# Patient Record
(demographics unavailable — no encounter records)

---

## 2024-10-09 NOTE — PHYSICAL EXAM
[None] : none [Average] : average [Cooperative] : cooperative [Euthymic] : euthymic [Full] : full [Clear] : clear [Linear/Goal Directed] : linear/goal directed [None Reported] : none reported [WNL] : within normal limits [FreeTextEntry7] : No SI/HI

## 2024-10-09 NOTE — PLAN
[Medication education provided] : Medication education provided. [Rationale for medication choices, possible risks/precautions, benefits, alternative treatment choices, and consequences of non-treatment discussed] : Rationale for medication choices, possible risks/precautions, benefits, alternative treatment choices, and consequences of non-treatment discussed with patient/family/caregiver  [FreeTextEntry5] : Psychoeducation and supportive therapy provided, and discussed rationale for recommended meds  Sleep hygiene Behavior activation Continue Latuda 40 mg/day, trazodone 150 mg HS, continue Celexa to 40 mg/day, Lamictal 200 mg BID Clonazepam 1 mg once a day as needed for panic attacks. (Patient reported today did not need to take Klonopin since last visit and does not need a prescription today) Continue individual therapy (outside)- prn Emergency procedures were discussed: pt. educated to call 911 or go to nearest ER for worsening of symptoms/suicidal/homicidal ideation.  RTC in 3 months or earlier if not tolerating reduction of trazodone Patient expressed understanding and agreement with above plan.   I stop checked today: Reference #: [id number]  Practitioner Count: 0 Pharmacy Count: 0 Current Opioid Prescriptions: 0 Current Benzodiazepine Prescriptions: 0 Current Stimulant Prescriptions: 0   Patient Demographic Information (PDI)     PDI	First Name	Last Name	Birth Date	Gender	Street Address	[facility]	Zip Code [place]	07/23/1982	Female	[address] [facility]	[id number]  Prescription Information    PDI Filter:   PDI	My Rx	Current Rx	Drug Type	Rx Written	Rx Dispensed	Drug	Quantity	Days Supply	Prescriber Name	Prescriber LAKEISHA #	Payment Method	Dispenser A	Y	N	B	02/02/2024	02/05/2024	clonazepam 1 mg tablet	30	30	Ruma Camargo	[id number]	Insurance	[facility] #[id number]

## 2024-10-09 NOTE — REASON FOR VISIT
[Patient preference] : as per patient preference [Telehealth (audio & video) - Individual/Group] : This visit was provided via telehealth using real-time 2-way audio visual technology. [Home] : The patient, [unfilled], was located at home, [unfilled], at the time of the visit. [Verbal consent obtained from patient/other participant(s)] : Verbal consent for telehealth/telephonic services obtained from patient/other participant(s) [Patient] : Patient [Other Location: e.g. Home (Enter Location, City,State)___] : The provider was located at [unfilled]. [Patient's space is appropriate for telehealth and maintains privacy/confidentiality.] : Patient's space is appropriate for telehealth and maintains privacy/confidentiality. [Participant(s) identity verified] : Participant(s) identity verified. [FreeTextEntry1] : depression, anxiety

## 2024-10-09 NOTE — HISTORY OF PRESENT ILLNESS
[FreeTextEntry1] : Patient states she has been doing well since last visit.  She denied feeling depressed and denied any anger outbursts or irritability.   Patient reported she is sleeping well and takes trazodone at bedtime. She states she has been feeling tired in the daytime for a while and recently had blood work and all the blood test results came back normal. She decided to lower trazodone to 75 mg, half tab of 150 mg, about 3 weeks ago, and she felt daytime tiredness had decreased and she is hoping to lower the dose down to 50 mg HS. She states she has not had problem falling asleep and or staying asleep with lowering trazodone dose.  Patient denied hearing voices or seeing shadows or images that other people around or not hearing or seeing.   Patient denied passive or active suicidal ideation.  Patient denied any thoughts of self-harm.   She denied excess ETOH use/abuse, denied cannabis and other substance use/abuse No new medical issues, no new medication since last visit Patient reported she is continuing to take Wegovy for weight loss,

## 2024-10-09 NOTE — DISCUSSION/SUMMARY
[FreeTextEntry1] : The patient is a 41 yo woman with a long history of recurrent depression, ADHD with onset in childhood. Caregiver stress+.  She reported mood instability and depression and Lamictal was increased on 11/28/18, with brief mood stability, on 2/15/1:presented with worsening sx of depression, lack of motivation and desire to do things, and decline in functioning and cross titration with tapering and stopping Celexa with titrating Zoloft.  4/3/19: she is so far doing well with sertraline switch, but not complete, continue cross taper and  5/7/19: patient called on 4/23/19 to report that she is not doing well and since she lowered Celexa to 20 mg and increased sertraline to 100 mg she is very irritable, and wanted to go back to lower dose of sertraline and increase Celexa. She reports she is doing worse and thinks this is because she has come down of Celexa quickly, though cross taper was over a period of few weeks and she does not appear to be having withdrawal sx, and insists she was doing the best on combination of Celexa 40 mg and sertraline 25 mg/day, and would like to resume that dose combination. She was informed that they both are SSRI and that it is not recommended patient take 2 SSRIs and if her depression worsens, we would have to consider increasing sertraline, and if so, would recommend tapering off Celexa, or stop Sertraline and go back to previous dose of Celexa, though that was no longer effective and hence the cross taper was started. She states she agrees to increase sertraline and lower Celexa and stop it if her depression were to increase, and wants to remains on reduced dose of Celexa 40 mg/day and sertraline 25 mg/day.  Meds: Celexa 40 mg/day and Sertraline 25, Continue Lamictal 200 mg BID. Continue Trazodone 100 mg HS for insomnia, Increase Vyvanse dose to 30 mg/day, DC Klonopin, switch to Ativan 1 mg BID prn for severe anxiety   5/17/19: Patient states she remains depressed as last visit and not irritable, but very "flat" and is taking low dose Celexa and sertraline.  Patient states she is "not sure anymore" and is worried about going up on Celexa and stay on sertraline, "what if that Celexa does not help".  Continue Celexa 20 mg/day and titrate sertraline up 25 mg every 3 days to 100 mg/day Continue Lamictal 200 mg BID. Continue Trazodone 100 mg HS for insomnia, Increase Vyvanse dose to 30 mg/day,  Ativan 1 mg BID prn for severe anxiety (she will call when she needs refill for ativan)  6/3/19 Patient states she is feeling better with current dose of sertraline, but she is worried about gaining weight and wants to be off it, and agrees to stop Celexa if we switched to another medication to help with depression. She also wants to go back to trial of low Dexedrine Psychoeducation and rationale and side effects, risks and benefits of med changes discussed 6/3/19: Stop Celexa  6/4/19 to 6/7/19: Sertraline 75 mg/day, 6/8 to 6/11 sertraline 50 mg/day, 6/12 to 6/15: Sertraline 25 mg/day, then stop, on 6/4/19 start Viibryd 5 mg/day for a week, then increase to 10 mg/day.  STOP Vyvanse, start Dexedrine 2.5 mg BID  Continue Lamictal 200 mg BID. Continue Trazodone 100 mg HS for insomnia,  Ativan 1 mg BID prn for severe anxiety   6/26/19 Patient is off sertraline and feels Viibryd is better, and agrees to taper and stop Celexa and continue Viibryd Continue Lamictal 200 mg BID. Continue Trazodone 100 mg HS for insomnia,  Ativan 1 mg BID prn for severe anxiety  Dexedrine 5 mg BID Discussed with patient potential for tolerance and dependence with BZD and advise patient not change doses by herself.  BP: 126/92, ID=[id number]: monitor BP at home and lose weight  7/25/19: Patient appears to be tolerating reduction of Celexa dose, no irritability, still depressed, and Dexedrine less effective for ADHD  Increase Viibryd to 20 mg/day, taper Celexa 10 mg every other day for a week, then stop Dexedrine 10 mg AM and 5 mg afternoon.  Continue Lamictal 200 mg BID. Continue Trazodone 100 mg HS for insomnia,  Ativan 1 mg BID prn for severe anxiety   8/6/19: Patient has nausea and vomiting when Viibryd dose was increased. She reports feeling depressed, and irritable, increased psychosocial stress- mother with terminal illness Taper and stop Viibryd. Titrate Celexa back to 40 mg/day, will add Latuda to augment Antidepressant.  Continue Dexedrine 10 mg AM and 5 mg afternoon, Lamictal 200 mg BID, continue Trazodone 100 mg HS for insomnia,  Ativan 1 mg BID prn for severe anxiety   09/12/19: Mother passed away on 08/08, patient is grieving mother's loss, and overall stable with current meds.  Celexa 20 mg/day, if she has worsening of depression, increase Celexa to 30 mg/day, agree with not starting Latuda  10/22/19: Patient states she is missing mother, has good and bad days, and reports increased worrying about germs.   12/3/19: Patient reports she feels depressed and anxious and feeling lost and hopeless and helpless.   12/27/19: Patient reports she feels a lot better with reduced depression and anxiety sx with addition of Latuda.   2/6/2020: patient reports she continues to feel better with significant reduction of depression and anxiety sx.   4/21/2020: Patient reports she is doing well since last visit, denies sx of depression, and anxiety is in good control, and denies intrusive thoughts and denies irritability and anger episodes.   7/14/2020: Patient remains stable with good control symptoms on current meds.   09/21/2020: Patient continues to remain stable with good control symptoms on current meds.   12/18/2020: Patient remains stable, no sx of depression and anxiety and or irritability, stable on current meds   12/23/2020: Patient reports no change from last week, states she forgot to ask about Rx for Klonopin. She states she does not know when she got Rx for it, but she had used ti as needed in the past year with better effect., She states she was taking Ativan as needed when she had one episode of hallucination when she travelled to [place] and is afraid to take Ativan anymore.   3/19/2021: Patient remains stable, no sx of depression and or anxiety reports from last visit. She states she is working more consistently and having trouble focusing and wants to resume Dexedrine again.   7/26/2021:  Patient continues to remains stable. Patient reports She feels current medications are helping.  She states she did not need to take Klonopin since last visit.    08/16/2021: Patient comes today to discuss her meds and recent abnormal hormone levels, she had been anxious about what the abnormal levels mean and how it would affect her to be coming off her psych meds because she feels stable on current med combination. Serum prolactin level is minimally elevated, she denies gynecomastia, galactorrhea, and considering patient is stable and on low dose of Latuda, will wait until patient sees endocrinologist for further assessment before making change to psychotropic meds. patient states she does not want to switch to a different provider at this time as planned previously due to change in insurance coverage  11/08/2021: Patient states knowing prolactin level and MRI barin are normal she feels anxiety sx improved from last visit and states sx of depression are in good control and no mood dysregulation reported since last visit.   2/7/2022: Patient reports that her depression and anxiety symptoms remain in good control with the current medication she however is frustrated and upset that she is not losing weight and though topamax is helping with migraine HA, she does not like it is giving her constipation and is upset that it's not aiding her with weight loss. Stetse Dexedrine helps with ADHD sx and also reducing appetite and managing her weight better  3/4/2022: Patient states she is feeling very anxious, tensed and on edge. She is on max dose of Celexa and did not tolerate well before when she was tapered off it, and she perkins snot want top take Klonopin frequently or daily, discussed increasing Latuda to augment citalopram to help reduce anxiety and worrying and she agrees to a trial, monitor for sx improvement and side effects.   4/08/2022:  Patient did not start using higher dose of Latuda, reports that her mood and anxiety symptoms improved as she came off of Topamax and got her period. She reports that the trazodone is not helping her fall asleep anymore and she's waking up feeling very tired and groggy with a higher dose of trazodone. Sleep hygiene education provided to the patient and advise patient lower trazodone for four days to 50 mg at bedtime and then stop it and start taking doxepin 3 mg HS for insomnia   5/2/2022: Patient stopped taking doxepin because she felt it was not helping and actually made her more anxious.  She is taking trazodone but still continues to have initial insomnia.  We discussed sleep hygiene and that we will try increasing trazodone to 150 mg daily at bedtime.  8/5/2022: Patient reports that increasing trazodone does help with the sleep and she reports she is not feeling depressed and anxiety is in good control and her mood is stable.  11/14/2022: Patient reported that for the past 2 weeks she is experiencing increased anxiety and irritability and poor sleep.  Discussed with the patient increasing Latuda to address mood irritability and she agreed to increase the dose and monitor   12/14/2022: Patient reported that she is feeling better with the reduction in irritability, racing thoughts and anxiety but still not 100% and states that overall she is feeling about 60% improvement and she wants to continue the same dose and continue to monitor for incremental improvement of anxiety symptoms.  2/13/2023: Patient continues to remain stable with good control of symptoms of depression slight increase in anxiety related to psychosocial stressors and in therapy she states learning to examine and change her behavior to manage relationships better.  5/8/2023: Patient reported that she is doing well and reported good control of symptoms of depression and anxiety with current medication regimen, denied panic attacks, denied symptoms suggestive of psychosis and feels that at this time she does not need ADD medications and she is also glad that she has been able to lose weight with the new medication regimen suggested by her primary care physician for weight loss  8/7/2023: Patient continues to remain stable doing well on current medications with good control of symptoms of depression  11/06/2023: Patient remains stable, no symptoms of depression anxiety in good control and tolerating current medications well.  2/2/2024: Patient reported a brief.  Of increase in anxiety after flu and health-related anxiety triggering some baseline anxiety but she took Klonopin at night for a few days with good effect and now off the Klonopin and anxiety remains in good control and overall moods stable and she is not depressed.  5/1/2024: The patient continues to remain stable with good control of depression and anxiety symptoms.   8/5/2024: Patient remained stable with good control of symptoms of depression and anxiety on current medication regimen.  10/09/2024: Patient remains stable, not depressed, no mood dysregulation and anxiety symptoms are in control, reported daytime tiredness, reduced with lowering trazodone, no change in sleep, wants to lower dose further and monitor

## 2024-12-21 NOTE — DISCUSSION/SUMMARY
[FreeTextEntry1] : The patient is a 39 yo woman with a long history of recurrent depression, ADHD with onset in childhood. Caregiver stress+.  She reported mood instability and depression and Lamictal was increased on 11/28/18, with brief mood stability, on 2/15/1:presented with worsening sx of depression, lack of motivation and desire to do things, and decline in functioning and cross titration with tapering and stopping Celexa with titrating Zoloft.  4/3/19: she is so far doing well with sertraline switch, but not complete, continue cross taper and  5/7/19: patient called on 4/23/19 to report that she is not doing well and since she lowered Celexa to 20 mg and increased sertraline to 100 mg she is very irritable, and wanted to go back to lower dose of sertraline and increase Celexa. She reports she is doing worse and thinks this is because she has come down of Celexa quickly, though cross taper was over a period of few weeks and she does not appear to be having withdrawal sx, and insists she was doing the best on combination of Celexa 40 mg and sertraline 25 mg/day, and would like to resume that dose combination. She was informed that they both are SSRI and that it is not recommended patient take 2 SSRIs and if her depression worsens, we would have to consider increasing sertraline, and if so, would recommend tapering off Celexa, or stop Sertraline and go back to previous dose of Celexa, though that was no longer effective and hence the cross taper was started. She states she agrees to increase sertraline and lower Celexa and stop it if her depression were to increase, and wants to remains on reduced dose of Celexa 40 mg/day and sertraline 25 mg/day.  Meds: Celexa 40 mg/day and Sertraline 25, Continue Lamictal 200 mg BID. Continue Trazodone 100 mg HS for insomnia, Increase Vyvanse dose to 30 mg/day, DC Klonopin, switch to Ativan 1 mg BID prn for severe anxiety   5/17/19: Patient states she remains depressed as last visit and not irritable, but very "flat" and is taking low dose Celexa and sertraline.  Patient states she is "not sure anymore" and is worried about going up on Celexa and stay on sertraline, "what if that Celexa does not help".  Continue Celexa 20 mg/day and titrate sertraline up 25 mg every 3 days to 100 mg/day Continue Lamictal 200 mg BID. Continue Trazodone 100 mg HS for insomnia, Increase Vyvanse dose to 30 mg/day,  Ativan 1 mg BID prn for severe anxiety (she will call when she needs refill for ativan)  6/3/19 Patient states she is feeling better with current dose of sertraline, but she is worried about gaining weight and wants to be off it, and agrees to stop Celexa if we switched to another medication to help with depression. She also wants to go back to trial of low Dexedrine Psychoeducation and rationale and side effects, risks and benefits of med changes discussed 6/3/19: Stop Celexa  6/4/19 to 6/7/19: Sertraline 75 mg/day, 6/8 to 6/11 sertraline 50 mg/day, 6/12 to 6/15: Sertraline 25 mg/day, then stop, on 6/4/19 start Viibryd 5 mg/day for a week, then increase to 10 mg/day.  STOP Vyvanse, start Dexedrine 2.5 mg BID  Continue Lamictal 200 mg BID. Continue Trazodone 100 mg HS for insomnia,  Ativan 1 mg BID prn for severe anxiety   6/26/19 Patient is off sertraline and feels Viibryd is better, and agrees to taper and stop Celexa and continue Viibryd Continue Lamictal 200 mg BID. Continue Trazodone 100 mg HS for insomnia,  Ativan 1 mg BID prn for severe anxiety  Dexedrine 5 mg BID Discussed with patient potential for tolerance and dependence with BZD and advise patient not change doses by herself.  BP: 126/92, NC=91: monitor BP at home and lose weight  7/25/19: Patient appears to be tolerating reduction of Celexa dose, no irritability, still depressed, and Dexedrine less effective for ADHD  Increase Viibryd to 20 mg/day, taper Celexa 10 mg every other day for a week, then stop Dexedrine 10 mg AM and 5 mg afternoon.  Continue Lamictal 200 mg BID. Continue Trazodone 100 mg HS for insomnia,  Ativan 1 mg BID prn for severe anxiety   8/6/19: Patient has nausea and vomiting when Viibryd dose was increased. She reports feeling depressed, and irritable, increased psychosocial stress- mother with terminal illness Taper and stop Viibryd. Titrate Celexa back to 40 mg/day, will add Latuda to augment Antidepressant.  Continue Dexedrine 10 mg AM and 5 mg afternoon, Lamictal 200 mg BID, continue Trazodone 100 mg HS for insomnia,  Ativan 1 mg BID prn for severe anxiety   09/12/19: Mother passed away on 08/08, patient is grieving mother's loss, and overall stable with current meds.  Celexa 20 mg/day, if she has worsening of depression, increase Celexa to 30 mg/day, agree with not starting Latuda  10/22/19: Patient states she is missing mother, has good and bad days, and reports increased worrying about germs.   12/3/19: Patient reports she feels depressed and anxious and feeling lost and hopeless and helpless.   12/27/19: Patient reports she feels a lot better with reduced depression and anxiety sx with addition of Latuda.   2/6/2020: patient reports she continues to feel better with significant reduction of depression and anxiety sx.   4/21/2020: Patient reports she is doing well since last visit, denies sx of depression, and anxiety is in good control, and denies intrusive thoughts and denies irritability and anger episodes.   7/14/2020: Patient remains stable with good control symptoms on current meds.   09/21/2020: Patient continues to remain stable with good control symptoms on current meds.   12/18/2020: Patient remains stable, no sx of depression and anxiety and or irritability, stable on current meds   12/23/2020: Patient reports no change from last week, states she forgot to ask about Rx for Klonopin. She states she does not know when she got Rx for it, but she had used ti as needed in the past year with better effect., She states she was taking Ativan as needed when she had one episode of hallucination when she travelled to [place] and is afraid to take Ativan anymore.   3/19/2021: Patient remains stable, no sx of depression and or anxiety reports from last visit. She states she is working more consistently and having trouble focusing and wants to resume Dexedrine again.   7/26/2021:  Patient continues to remains stable. Patient reports She feels current medications are helping.  She states she did not need to take Klonopin since last visit.    08/16/2021: Patient comes today to discuss her meds and recent abnormal hormone levels, she had been anxious about what the abnormal levels mean and how it would affect her to be coming off her psych meds because she feels stable on current med combination. Serum prolactin level is minimally elevated, she denies gynecomastia, galactorrhea, and considering patient is stable and on low dose of Latuda, will wait until patient sees endocrinologist for further assessment before making change to psychotropic meds. patient states she does not want to switch to a different provider at this time as planned previously due to change in insurance coverage  11/08/2021: Patient states knowing prolactin level and MRI barin are normal she feels anxiety sx improved from last visit and states sx of depression are in good control and no mood dysregulation reported since last visit.   2/7/2022: Patient reports that her depression and anxiety symptoms remain in good control with the current medication she however is frustrated and upset that she is not losing weight and though topamax is helping with migraine HA, she does not like it is giving her constipation and is upset that it's not aiding her with weight loss. Stetse Dexedrine helps with ADHD sx and also reducing appetite and managing her weight better  3/4/2022: Patient states she is feeling very anxious, tensed and on edge. She is on max dose of Celexa and did not tolerate well before when she was tapered off it, and she perkins snot want top take Klonopin frequently or daily, discussed increasing Latuda to augment citalopram to help reduce anxiety and worrying and she agrees to a trial, monitor for sx improvement and side effects.   4/08/2022:  Patient did not start using higher dose of Latuda, reports that her mood and anxiety symptoms improved as she came off of Topamax and got her period. She reports that the trazodone is not helping her fall asleep anymore and she's waking up feeling very tired and groggy with a higher dose of trazodone. Sleep hygiene education provided to the patient and advise patient lower trazodone for four days to 50 mg at bedtime and then stop it and start taking doxepin 3 mg HS for insomnia   5/2/2022: Patient stopped taking doxepin because she felt it was not helping and actually made her more anxious.  She is taking trazodone but still continues to have initial insomnia.  We discussed sleep hygiene and that we will try increasing trazodone to 150 mg daily at bedtime.  8/5/2022: Patient reports that increasing trazodone does help with the sleep and she reports she is not feeling depressed and anxiety is in good control and her mood is stable.  11/14/2022: Patient reported that for the past 2 weeks she is experiencing increased anxiety and irritability and poor sleep.  Discussed with the patient increasing Latuda to address mood irritability and she agreed to increase the dose and monitor   12/14/2022: Patient reported that she is feeling better with the reduction in irritability, racing thoughts and anxiety but still not 100% and states that overall she is feeling about 60% improvement and she wants to continue the same dose and continue to monitor for incremental improvement of anxiety symptoms.  2/13/2023: Patient continues to remain stable with good control of symptoms of depression slight increase in anxiety related to psychosocial stressors and in therapy she states learning to examine and change her behavior to manage relationships better.  5/8/2023: Patient reported that she is doing well and reported good control of symptoms of depression and anxiety with current medication regimen, denied panic attacks, denied symptoms suggestive of psychosis and feels that at this time she does not need ADD medications and she is also glad that she has been able to lose weight with the new medication regimen suggested by her primary care physician for weight loss  8/7/2023: Patient continues to remain stable doing well on current medications with good control of symptoms of depression  11/06/2023: Patient remains stable, no symptoms of depression anxiety in good control and tolerating current medications well.  2/2/2024: Patient reported a brief.  Of increase in anxiety after flu and health-related anxiety triggering some baseline anxiety but she took Klonopin at night for a few days with good effect and now off the Klonopin and anxiety remains in good control and overall moods stable and she is not depressed.  5/1/2024: The patient continues to remain stable with good control of depression and anxiety symptoms.   8/5/2024: Patient remained stable with good control of symptoms of depression and anxiety on current medication regimen.  10/09/2024: Patient remains stable, not depressed, no mood dysregulation and anxiety symptoms are in control, reported daytime tiredness, reduced with lowering trazodone, no change in sleep, wants to lower dose further and monitor 12/17/2024: Patient reported some psychosocial stressors contributing to increased anxiety leading up to some cycle sensitive phenomenon like hearing people calling her name or seeing objects in the corner of her eye which worried her and a lot more but that since she and her sister are no longer fighting with her she feels her mood is improving and that she would rather not make any medication changes and instead wants to find a therapist to help her cope with these stressors.

## 2024-12-21 NOTE — DISCUSSION/SUMMARY
[FreeTextEntry1] : The patient is a 41 yo woman with a long history of recurrent depression, ADHD with onset in childhood. Caregiver stress+.  She reported mood instability and depression and Lamictal was increased on 11/28/18, with brief mood stability, on 2/15/1:presented with worsening sx of depression, lack of motivation and desire to do things, and decline in functioning and cross titration with tapering and stopping Celexa with titrating Zoloft.  4/3/19: she is so far doing well with sertraline switch, but not complete, continue cross taper and  5/7/19: patient called on 4/23/19 to report that she is not doing well and since she lowered Celexa to 20 mg and increased sertraline to 100 mg she is very irritable, and wanted to go back to lower dose of sertraline and increase Celexa. She reports she is doing worse and thinks this is because she has come down of Celexa quickly, though cross taper was over a period of few weeks and she does not appear to be having withdrawal sx, and insists she was doing the best on combination of Celexa 40 mg and sertraline 25 mg/day, and would like to resume that dose combination. She was informed that they both are SSRI and that it is not recommended patient take 2 SSRIs and if her depression worsens, we would have to consider increasing sertraline, and if so, would recommend tapering off Celexa, or stop Sertraline and go back to previous dose of Celexa, though that was no longer effective and hence the cross taper was started. She states she agrees to increase sertraline and lower Celexa and stop it if her depression were to increase, and wants to remains on reduced dose of Celexa 40 mg/day and sertraline 25 mg/day.  Meds: Celexa 40 mg/day and Sertraline 25, Continue Lamictal 200 mg BID. Continue Trazodone 100 mg HS for insomnia, Increase Vyvanse dose to 30 mg/day, DC Klonopin, switch to Ativan 1 mg BID prn for severe anxiety   5/17/19: Patient states she remains depressed as last visit and not irritable, but very "flat" and is taking low dose Celexa and sertraline.  Patient states she is "not sure anymore" and is worried about going up on Celexa and stay on sertraline, "what if that Celexa does not help".  Continue Celexa 20 mg/day and titrate sertraline up 25 mg every 3 days to 100 mg/day Continue Lamictal 200 mg BID. Continue Trazodone 100 mg HS for insomnia, Increase Vyvanse dose to 30 mg/day,  Ativan 1 mg BID prn for severe anxiety (she will call when she needs refill for ativan)  6/3/19 Patient states she is feeling better with current dose of sertraline, but she is worried about gaining weight and wants to be off it, and agrees to stop Celexa if we switched to another medication to help with depression. She also wants to go back to trial of low Dexedrine Psychoeducation and rationale and side effects, risks and benefits of med changes discussed 6/3/19: Stop Celexa  6/4/19 to 6/7/19: Sertraline 75 mg/day, 6/8 to 6/11 sertraline 50 mg/day, 6/12 to 6/15: Sertraline 25 mg/day, then stop, on 6/4/19 start Viibryd 5 mg/day for a week, then increase to 10 mg/day.  STOP Vyvanse, start Dexedrine 2.5 mg BID  Continue Lamictal 200 mg BID. Continue Trazodone 100 mg HS for insomnia,  Ativan 1 mg BID prn for severe anxiety   6/26/19 Patient is off sertraline and feels Viibryd is better, and agrees to taper and stop Celexa and continue Viibryd Continue Lamictal 200 mg BID. Continue Trazodone 100 mg HS for insomnia,  Ativan 1 mg BID prn for severe anxiety  Dexedrine 5 mg BID Discussed with patient potential for tolerance and dependence with BZD and advise patient not change doses by herself.  BP: 126/92, RI=91: monitor BP at home and lose weight  7/25/19: Patient appears to be tolerating reduction of Celexa dose, no irritability, still depressed, and Dexedrine less effective for ADHD  Increase Viibryd to 20 mg/day, taper Celexa 10 mg every other day for a week, then stop Dexedrine 10 mg AM and 5 mg afternoon.  Continue Lamictal 200 mg BID. Continue Trazodone 100 mg HS for insomnia,  Ativan 1 mg BID prn for severe anxiety   8/6/19: Patient has nausea and vomiting when Viibryd dose was increased. She reports feeling depressed, and irritable, increased psychosocial stress- mother with terminal illness Taper and stop Viibryd. Titrate Celexa back to 40 mg/day, will add Latuda to augment Antidepressant.  Continue Dexedrine 10 mg AM and 5 mg afternoon, Lamictal 200 mg BID, continue Trazodone 100 mg HS for insomnia,  Ativan 1 mg BID prn for severe anxiety   09/12/19: Mother passed away on 08/08, patient is grieving mother's loss, and overall stable with current meds.  Celexa 20 mg/day, if she has worsening of depression, increase Celexa to 30 mg/day, agree with not starting Latuda  10/22/19: Patient states she is missing mother, has good and bad days, and reports increased worrying about germs.   12/3/19: Patient reports she feels depressed and anxious and feeling lost and hopeless and helpless.   12/27/19: Patient reports she feels a lot better with reduced depression and anxiety sx with addition of Latuda.   2/6/2020: patient reports she continues to feel better with significant reduction of depression and anxiety sx.   4/21/2020: Patient reports she is doing well since last visit, denies sx of depression, and anxiety is in good control, and denies intrusive thoughts and denies irritability and anger episodes.   7/14/2020: Patient remains stable with good control symptoms on current meds.   09/21/2020: Patient continues to remain stable with good control symptoms on current meds.   12/18/2020: Patient remains stable, no sx of depression and anxiety and or irritability, stable on current meds   12/23/2020: Patient reports no change from last week, states she forgot to ask about Rx for Klonopin. She states she does not know when she got Rx for it, but she had used ti as needed in the past year with better effect., She states she was taking Ativan as needed when she had one episode of hallucination when she travelled to [place] and is afraid to take Ativan anymore.   3/19/2021: Patient remains stable, no sx of depression and or anxiety reports from last visit. She states she is working more consistently and having trouble focusing and wants to resume Dexedrine again.   7/26/2021:  Patient continues to remains stable. Patient reports She feels current medications are helping.  She states she did not need to take Klonopin since last visit.    08/16/2021: Patient comes today to discuss her meds and recent abnormal hormone levels, she had been anxious about what the abnormal levels mean and how it would affect her to be coming off her psych meds because she feels stable on current med combination. Serum prolactin level is minimally elevated, she denies gynecomastia, galactorrhea, and considering patient is stable and on low dose of Latuda, will wait until patient sees endocrinologist for further assessment before making change to psychotropic meds. patient states she does not want to switch to a different provider at this time as planned previously due to change in insurance coverage  11/08/2021: Patient states knowing prolactin level and MRI barin are normal she feels anxiety sx improved from last visit and states sx of depression are in good control and no mood dysregulation reported since last visit.   2/7/2022: Patient reports that her depression and anxiety symptoms remain in good control with the current medication she however is frustrated and upset that she is not losing weight and though topamax is helping with migraine HA, she does not like it is giving her constipation and is upset that it's not aiding her with weight loss. Stetse Dexedrine helps with ADHD sx and also reducing appetite and managing her weight better  3/4/2022: Patient states she is feeling very anxious, tensed and on edge. She is on max dose of Celexa and did not tolerate well before when she was tapered off it, and she perkins snot want top take Klonopin frequently or daily, discussed increasing Latuda to augment citalopram to help reduce anxiety and worrying and she agrees to a trial, monitor for sx improvement and side effects.   4/08/2022:  Patient did not start using higher dose of Latuda, reports that her mood and anxiety symptoms improved as she came off of Topamax and got her period. She reports that the trazodone is not helping her fall asleep anymore and she's waking up feeling very tired and groggy with a higher dose of trazodone. Sleep hygiene education provided to the patient and advise patient lower trazodone for four days to 50 mg at bedtime and then stop it and start taking doxepin 3 mg HS for insomnia   5/2/2022: Patient stopped taking doxepin because she felt it was not helping and actually made her more anxious.  She is taking trazodone but still continues to have initial insomnia.  We discussed sleep hygiene and that we will try increasing trazodone to 150 mg daily at bedtime.  8/5/2022: Patient reports that increasing trazodone does help with the sleep and she reports she is not feeling depressed and anxiety is in good control and her mood is stable.  11/14/2022: Patient reported that for the past 2 weeks she is experiencing increased anxiety and irritability and poor sleep.  Discussed with the patient increasing Latuda to address mood irritability and she agreed to increase the dose and monitor   12/14/2022: Patient reported that she is feeling better with the reduction in irritability, racing thoughts and anxiety but still not 100% and states that overall she is feeling about 60% improvement and she wants to continue the same dose and continue to monitor for incremental improvement of anxiety symptoms.  2/13/2023: Patient continues to remain stable with good control of symptoms of depression slight increase in anxiety related to psychosocial stressors and in therapy she states learning to examine and change her behavior to manage relationships better.  5/8/2023: Patient reported that she is doing well and reported good control of symptoms of depression and anxiety with current medication regimen, denied panic attacks, denied symptoms suggestive of psychosis and feels that at this time she does not need ADD medications and she is also glad that she has been able to lose weight with the new medication regimen suggested by her primary care physician for weight loss  8/7/2023: Patient continues to remain stable doing well on current medications with good control of symptoms of depression  11/06/2023: Patient remains stable, no symptoms of depression anxiety in good control and tolerating current medications well.  2/2/2024: Patient reported a brief.  Of increase in anxiety after flu and health-related anxiety triggering some baseline anxiety but she took Klonopin at night for a few days with good effect and now off the Klonopin and anxiety remains in good control and overall moods stable and she is not depressed.  5/1/2024: The patient continues to remain stable with good control of depression and anxiety symptoms.   8/5/2024: Patient remained stable with good control of symptoms of depression and anxiety on current medication regimen.  10/09/2024: Patient remains stable, not depressed, no mood dysregulation and anxiety symptoms are in control, reported daytime tiredness, reduced with lowering trazodone, no change in sleep, wants to lower dose further and monitor 12/17/2024: Patient reported some psychosocial stressors contributing to increased anxiety leading up to some cycle sensitive phenomenon like hearing people calling her name or seeing objects in the corner of her eye which worried her and a lot more but that since she and her sister are no longer fighting with her she feels her mood is improving and that she would rather not make any medication changes and instead wants to find a therapist to help her cope with these stressors.

## 2024-12-21 NOTE — PLAN
[Medication education provided] : Medication education provided. [Rationale for medication choices, possible risks/precautions, benefits, alternative treatment choices, and consequences of non-treatment discussed] : Rationale for medication choices, possible risks/precautions, benefits, alternative treatment choices, and consequences of non-treatment discussed with patient/family/caregiver  [FreeTextEntry5] : Psychoeducation and supportive therapy provided, and discussed rationale for recommended meds  Sleep hygiene Behavior activation Continue Latuda 40 mg/day, trazodone 150 mg HS, continue Celexa to 40 mg/day, Lamictal 200 mg BID Clonazepam 1 mg once a day as needed for panic attacks. (Patient reported today did not need to take Klonopin since last visit and does not need a prescription today) Continue individual therapy (outside)- patient wants to fine a new therapist Emergency procedures were discussed: pt. educated to call 911 or go to nearest ER for worsening of symptoms/suicidal/homicidal ideation.  RTC in 4-6 weeks or earlier if not tolerating reduction of trazodone Patient expressed understanding and agreement with above plan.   I stop checked today: Reference #: [id number]  Practitioner Count: 0 Pharmacy Count: 0 Current Opioid Prescriptions: 0 Current Benzodiazepine Prescriptions: 0 Current Stimulant Prescriptions: 0   Patient Demographic Information (PDI)     PDI	First Name	Last Name	Birth Date	Gender	Street Address	[facility]	Zip Code [place]	07/23/1982	Female	[address] [facility]	[id number]  Prescription Information PDI	My Rx	Current Rx	Drug Type	Rx Written	Rx Dispensed	Drug	Quantity	Days Supply	Prescriber Name	Prescriber LAKEISHA #	Payment Method	Dispenser A	Y	N	B	02/02/2024	02/05/2024	clonazepam 1 mg tablet	30	30	Ruma Camargo	[id number]	Insurance	[facility] #[id number]

## 2024-12-21 NOTE — PLAN
[Medication education provided] : Medication education provided. [Rationale for medication choices, possible risks/precautions, benefits, alternative treatment choices, and consequences of non-treatment discussed] : Rationale for medication choices, possible risks/precautions, benefits, alternative treatment choices, and consequences of non-treatment discussed with patient/family/caregiver  [FreeTextEntry5] : Psychoeducation and supportive therapy provided, and discussed rationale for recommended meds  Sleep hygiene Behavior activation Continue Latuda 40 mg/day, trazodone 150 mg HS, continue Celexa to 40 mg/day, Lamictal 200 mg BID Clonazepam 1 mg once a day as needed for panic attacks. (Patient reported today did not need to take Klonopin since last visit and does not need a prescription today) Continue individual therapy (outside)- patient wants to fine a new therapist Emergency procedures were discussed: pt. educated to call 911 or go to nearest ER for worsening of symptoms/suicidal/homicidal ideation.  RTC in 4-6 weeks or earlier if not tolerating reduction of trazodone Patient expressed understanding and agreement with above plan.   I stop checked today: Reference #: [id number]  Practitioner Count: 0 Pharmacy Count: 0 Current Opioid Prescriptions: 0 Current Benzodiazepine Prescriptions: 0 Current Stimulant Prescriptions: 0   Patient Demographic Information (PDI)     PDI	First Name	Last Name	Birth Date	Gender	Street Address	[facility]	Zip Code [place]	07/23/1982	Female	[address] [facility] [address] [facility]	[address]  Prescription Information PDI	My Rx	Current Rx	Drug Type	Rx Written	Rx Dispensed	Drug	Quantity	Days Supply	Prescriber Name	Prescriber LAKEISHA #	Payment Method	Dispenser A	Y	N	B	02/02/2024	02/05/2024	clonazepam 1 mg tablet	30	30	Ruma Camargo	[id number]	Insurance	[facility] #[id number]  Olanzapine Pregnancy And Lactation Text: This medication is pregnancy category C.   There are no adequate and well controlled trials with olanzapine in pregnant females.  Olanzapine should be used during pregnancy only if the potential benefit justifies the potential risk to the fetus.   In a study in lactating healthy women, olanzapine was excreted in breast milk.  It is recommended that women taking olanzapine should not breast feed.

## 2024-12-21 NOTE — HISTORY OF PRESENT ILLNESS
[FreeTextEntry1] : Patient reported that she, "bad couple of days".  Patient states that she was under a lot of stress and 1 day she was hearing and seeing things this was around Thanksgiving holiday weekend.  Patient reported that that she is in a new relationship and she has been trying to juggle the relationship and the family and she and her sister were fighting.  Patient states she heard people call her name and that she was seeing some objects in the periphery of her vision but this only lasted 1 or 2 days.  Patient reported that during that time she had trouble sleeping so she took Klonopin for a couple of nights and overall.  Patient states that the Klonopin 1 mg did not seem to help so she took 2 tablets then.  Patient reported that that she has not needed to take Klonopin in a very long time until the recent episode.  Patient states that she and her sister are no longer fighting and that is also relieving some stress since she feels that they will and states that that she really does not want to make any medication changes and that she has not been seeing her previous therapist and she states that that she would like to find a new therapist because she feels that that she would benefit from therapy and currently does not want to make any medication changes. Before this couple of weeks with the new symptoms she states she was overall doing well and did not have any issues with his sleep or appetite or any. Patient denied passive or active suicidal ideation.  Patient denied any thoughts of self-harm.   She denied excess ETOH use/abuse, denied cannabis and other substance use/abuse No new medical issues, no new medication since last visit Patient reported she is continuing to take Wegovy for weight loss,

## 2024-12-21 NOTE — REASON FOR VISIT
[Patient preference] : as per patient preference [Telehealth (audio & video) - Individual/Group] : This visit was provided via telehealth using real-time 2-way audio visual technology. [Other Location: e.g. Home (Enter Location, City,State)___] : The provider was located at [unfilled]. [Home] : The patient, [unfilled], was located at home, [unfilled], at the time of the visit. [Patient's space is appropriate for telehealth and maintains privacy/confidentiality.] : Patient's space is appropriate for telehealth and maintains privacy/confidentiality. [Participant(s) identity verified] : Participant(s) identity verified. [Verbal consent obtained from patient/other participant(s)] : Verbal consent for telehealth/telephonic services obtained from patient/other participant(s) [Patient] : Patient [FreeTextEntry1] : depression, anxiety

## 2025-01-08 NOTE — REASON FOR VISIT
[TextEntry] : 42-year-old female who presents for urinary frequency and suprapubic pressure  Patient reports for the last 2 months she has been dealing with urinary frequency hourly, nocturia x 1, increased frequency in the evening, and suprapubic pressure that is unrelated to voiding.  She denies straining with bladder emptying.  She denies incontinence.  She denies gross hematuria.  Her urologic history significant for reflux status post an open repair in 1984.  She also had episodes of bladder pain in the past.  She does not recall what helped her with the symptoms.  She also reports that she may develop kidney infections 4 to 5 years.  She generally gets about 1 UTI per year.  She denies any inciting factor in the last 2 months that would have triggered the symptoms.  Prior to 2 months ago, she had no issues with frequency, denied nocturia.  She had no pressure symptoms.  She is a G0, P0.  She denies pelvic surgeries other than her urologic history.  She has significant vaginal symptoms including dyspareunia, dryness, irritation.  She been dealing with this for years.  She has regular menses though and does not consider self to be perimenopausal.  She suffers from constipation.  She used to have a bowel movement once per month.  Now she is down to once a week.  She uses fiber and MiraLAX.  She denies diabetes She has a history of Crohn's disease She has a history of lupus She has a history of 3 TIAs She denies any clotting disorders  PVR 1 cc  A1c 5.2% 9/2024 Creatinine 0.83 9/2024

## 2025-01-08 NOTE — ASSESSMENT
[FreeTextEntry1] : 42-year-old female who presents for:  1.  Urinary frequency-we will send her urine today for urinalysis and urine culture.  Will also send her for renal bladder ultrasound.  Will we will determine if further workup needs to be performed.  Assuming the urine studies and ultrasound are negative, we will focus on her symptoms.  We did discuss the impact of vaginal symptoms as well as constipation on bladder symptoms.  We did discuss the use of coconut oil and given her significant vaginal symptoms.  2.  Suprapubic pressure-this is unrelated to bowel movements and voiding.  She has a history of bladder pain.  Discussed ending her to pelvic floor physical therapy to work on various exercises to diffuse some of her discomfort.  Follow-up urine studies Follow-up ultrasound

## 2025-01-08 NOTE — REVIEW OF SYSTEMS
[Recent Weight Gain (___ Lbs)] : recent [unfilled] ~Ulb weight gain [Recent Weight Loss (___ Lbs)] : recent [unfilled] ~Ulb weight loss [Eyesight Problems] : eyesight problems [Abdominal Pain] : abdominal pain [Constipation] : constipation [Painful [place]] : painful [place] [Itching] : itching [Limb Weakness] : limb weakness [Anxiety] : anxiety [Depression] : depression [Negative] : Heme/Lymph

## 2025-02-01 NOTE — REASON FOR VISIT
[Patient preference] : as per patient preference [Telehealth (audio & video) - Individual/Group] : This visit was provided via telehealth using real-time 2-way audio visual technology. [Other Location: e.g. Home (Enter Location, City,State)___] : The provider was located at [unfilled]. [Home] : The patient, [unfilled], was located at home, [unfilled], at the time of the visit. [Patient's space is appropriate for telehealth and maintains privacy/confidentiality.] : Patient's space is appropriate for telehealth and maintains privacy/confidentiality. [Participant(s) identity verified] : Participant(s) identity verified. [Verbal consent obtained from patient/other participant(s)] : Verbal consent for telehealth/telephonic services obtained from patient/other participant(s) [Patient] : Patient [Medical Office: ([facility])___] : The provider was located at the medical office in [unfilled]. [FreeTextEntry1] : depression, anxiety

## 2025-02-01 NOTE — REASON FOR VISIT
[Patient preference] : as per patient preference [Telehealth (audio & video) - Individual/Group] : This visit was provided via telehealth using real-time 2-way audio visual technology. [Other Location: e.g. Home (Enter Location, City,State)___] : The provider was located at [unfilled]. [Home] : The patient, [unfilled], was located at home, [unfilled], at the time of the visit. [Patient's space is appropriate for telehealth and maintains privacy/confidentiality.] : Patient's space is appropriate for telehealth and maintains privacy/confidentiality. [Participant(s) identity verified] : Participant(s) identity verified. [Verbal consent obtained from patient/other participant(s)] : Verbal consent for telehealth/telephonic services obtained from patient/other participant(s) [Patient] : Patient [Medical Office: ([facility])___] : The provider was located at the medical office in [unfilled]. [FreeTextEntry1] : depression, anxiety

## 2025-02-01 NOTE — PLAN
[Medication education provided] : Medication education provided. [Rationale for medication choices, possible risks/precautions, benefits, alternative treatment choices, and consequences of non-treatment discussed] : Rationale for medication choices, possible risks/precautions, benefits, alternative treatment choices, and consequences of non-treatment discussed with patient/family/caregiver  [FreeTextEntry5] : Psychoeducation provided and discussed rationale for recommended meds  Supportive therapy: Offered supportive listening and validation of patient's progress. Sleep hygiene education: Establishing a bedtime routine and avoiding prolonged time in bed when not sleeping, creating a sleep routine, using relaxation techniques, and avoiding clock-watching when having trouble sleeping. Medications: Agreed to increase trazodone dose from 50mg to 75mg (half of a 150mg tablet) for sleep. Patient to try this dosage and report back on effectiveness. Will consider increasing to 100mg if needed. Continue Latuda 40 mg/day, trazodone 150 mg HS, continue Celexa to 40 mg/day, Lamictal 200 mg BID Clonazepam 1 mg once a day as needed for panic attacks. (Patient reported today did not need to take Klonopin since last visit and does not need a prescription today) Continue individual therapy (outside) Emergency procedures were discussed: pt. educated to call 911 or go to nearest ER for worsening of symptoms/suicidal/homicidal ideation.  RTC in 2.5 to 3 months or earlier if not tolerating reduction of trazodone Patient expressed understanding and agreement with above plan.   I stop checked today: Reference #: [id number]  Practitioner Count: 0 Pharmacy Count: 0 Current Opioid Prescriptions: 0 Current Benzodiazepine Prescriptions: 0 Current Stimulant Prescriptions: 0   Patient Demographic Information (PDI)     PDI	First Name	Last Name	Birth Date	Gender	Street Address	[facility]	Zip Code [place]	07/23/1982	Female	[address] [facility]	[address]  Prescription Information PDI	My Rx	Current Rx	Drug Type	Rx Written	Rx Dispensed	Drug	Quantity	Days Supply	Prescriber Name	Prescriber LAKEISHA #	Payment Method	Dispenser A	Y	N	B	02/02/2024	02/05/2024	clonazepam 1 mg tablet	30	30	Ruma Camargo	[id number]	Insurance	[facility] #[id number]

## 2025-02-01 NOTE — PLAN
[Medication education provided] : Medication education provided. [Rationale for medication choices, possible risks/precautions, benefits, alternative treatment choices, and consequences of non-treatment discussed] : Rationale for medication choices, possible risks/precautions, benefits, alternative treatment choices, and consequences of non-treatment discussed with patient/family/caregiver  [FreeTextEntry5] : Psychoeducation provided and discussed rationale for recommended meds  Supportive therapy: Offered supportive listening and validation of patient's progress. Sleep hygiene education: Establishing a bedtime routine and avoiding prolonged time in bed when not sleeping, creating a sleep routine, using relaxation techniques, and avoiding clock-watching when having trouble sleeping. Medications: Agreed to increase trazodone dose from 50mg to 75mg (half of a 150mg tablet) for sleep. Patient to try this dosage and report back on effectiveness. Will consider increasing to 100mg if needed. Continue Latuda 40 mg/day, trazodone 150 mg HS, continue Celexa to 40 mg/day, Lamictal 200 mg BID Clonazepam 1 mg once a day as needed for panic attacks. (Patient reported today did not need to take Klonopin since last visit and does not need a prescription today) Continue individual therapy (outside) Emergency procedures were discussed: pt. educated to call 911 or go to nearest ER for worsening of symptoms/suicidal/homicidal ideation.  RTC in 2.5 to 3 months or earlier if not tolerating reduction of trazodone Patient expressed understanding and agreement with above plan.   I stop checked today: Reference #: [id number]  Practitioner Count: 0 Pharmacy Count: 0 Current Opioid Prescriptions: 0 Current Benzodiazepine Prescriptions: 0 Current Stimulant Prescriptions: 0   Patient Demographic Information (PDI)     PDI	First Name	Last Name	Birth Date	Gender	Street Address	[facility]	Zip Code [place]	07/23/1982	Female	[address] [facility]	[address]  Prescription Information PDI	My Rx	Current Rx	Drug Type	Rx Written	Rx Dispensed	Drug	Quantity	Days Supply	Prescriber Name	Prescriber LAKEISHA #	Payment Method	Dispenser A	Y	N	B	02/02/2024	02/05/2024	clonazepam 1 mg tablet	30	30	Ruma Camargo	[id number]	Insurance	[facility] #[id number]

## 2025-02-01 NOTE — DISCUSSION/SUMMARY
[FreeTextEntry1] : The patient is a 39 yo woman with a long history of recurrent depression, ADHD with onset in childhood. Caregiver stress+.  She reported mood instability and depression and Lamictal was increased on 11/28/18, with brief mood stability, on 2/15/1:presented with worsening sx of depression, lack of motivation and desire to do things, and decline in functioning and cross titration with tapering and stopping Celexa with titrating Zoloft.  4/3/19: she is so far doing well with sertraline switch, but not complete, continue cross taper and  5/7/19: patient called on 4/23/19 to report that she is not doing well and since she lowered Celexa to 20 mg and increased sertraline to 100 mg she is very irritable, and wanted to go back to lower dose of sertraline and increase Celexa. She reports she is doing worse and thinks this is because she has come down of Celexa quickly, though cross taper was over a period of few weeks and she does not appear to be having withdrawal sx, and insists she was doing the best on combination of Celexa 40 mg and sertraline 25 mg/day, and would like to resume that dose combination. She was informed that they both are SSRI and that it is not recommended patient take 2 SSRIs and if her depression worsens, we would have to consider increasing sertraline, and if so, would recommend tapering off Celexa, or stop Sertraline and go back to previous dose of Celexa, though that was no longer effective and hence the cross taper was started. She states she agrees to increase sertraline and lower Celexa and stop it if her depression were to increase, and wants to remains on reduced dose of Celexa 40 mg/day and sertraline 25 mg/day.  Meds: Celexa 40 mg/day and Sertraline 25, Continue Lamictal 200 mg BID. Continue Trazodone 100 mg HS for insomnia, Increase Vyvanse dose to 30 mg/day, DC Klonopin, switch to Ativan 1 mg BID prn for severe anxiety   5/17/19: Patient states she remains depressed as last visit and not irritable, but very "flat" and is taking low dose Celexa and sertraline.  Patient states she is "not sure anymore" and is worried about going up on Celexa and stay on sertraline, "what if that Celexa does not help".  Continue Celexa 20 mg/day and titrate sertraline up 25 mg every 3 days to 100 mg/day Continue Lamictal 200 mg BID. Continue Trazodone 100 mg HS for insomnia, Increase Vyvanse dose to 30 mg/day,  Ativan 1 mg BID prn for severe anxiety (she will call when she needs refill for ativan)  6/3/19 Patient states she is feeling better with current dose of sertraline, but she is worried about gaining weight and wants to be off it, and agrees to stop Celexa if we switched to another medication to help with depression. She also wants to go back to trial of low Dexedrine Psychoeducation and rationale and side effects, risks and benefits of med changes discussed 6/3/19: Stop Celexa  6/4/19 to 6/7/19: Sertraline 75 mg/day, 6/8 to 6/11 sertraline 50 mg/day, 6/12 to 6/15: Sertraline 25 mg/day, then stop, on 6/4/19 start Viibryd 5 mg/day for a week, then increase to 10 mg/day.  STOP Vyvanse, start Dexedrine 2.5 mg BID  Continue Lamictal 200 mg BID. Continue Trazodone 100 mg HS for insomnia,  Ativan 1 mg BID prn for severe anxiety   6/26/19 Patient is off sertraline and feels Viibryd is better, and agrees to taper and stop Celexa and continue Viibryd Continue Lamictal 200 mg BID. Continue Trazodone 100 mg HS for insomnia,  Ativan 1 mg BID prn for severe anxiety  Dexedrine 5 mg BID Discussed with patient potential for tolerance and dependence with BZD and advise patient not change doses by herself.  BP: 126/92, AL=91: monitor BP at home and lose weight  7/25/19: Patient appears to be tolerating reduction of Celexa dose, no irritability, still depressed, and Dexedrine less effective for ADHD  Increase Viibryd to 20 mg/day, taper Celexa 10 mg every other day for a week, then stop Dexedrine 10 mg AM and 5 mg afternoon.  Continue Lamictal 200 mg BID. Continue Trazodone 100 mg HS for insomnia,  Ativan 1 mg BID prn for severe anxiety   8/6/19: Patient has nausea and vomiting when Viibryd dose was increased. She reports feeling depressed, and irritable, increased psychosocial stress- mother with terminal illness Taper and stop Viibryd. Titrate Celexa back to 40 mg/day, will add Latuda to augment Antidepressant.  Continue Dexedrine 10 mg AM and 5 mg afternoon, Lamictal 200 mg BID, continue Trazodone 100 mg HS for insomnia,  Ativan 1 mg BID prn for severe anxiety   09/12/19: Mother passed away on 08/08, patient is grieving mother's loss, and overall stable with current meds.  Celexa 20 mg/day, if she has worsening of depression, increase Celexa to 30 mg/day, agree with not starting Latuda  10/22/19: Patient states she is missing mother, has good and bad days, and reports increased worrying about germs.   12/3/19: Patient reports she feels depressed and anxious and feeling lost and hopeless and helpless.   12/27/19: Patient reports she feels a lot better with reduced depression and anxiety sx with addition of Latuda.   2/6/2020: patient reports she continues to feel better with significant reduction of depression and anxiety sx.   4/21/2020: Patient reports she is doing well since last visit, denies sx of depression, and anxiety is in good control, and denies intrusive thoughts and denies irritability and anger episodes.   7/14/2020: Patient remains stable with good control symptoms on current meds.   09/21/2020: Patient continues to remain stable with good control symptoms on current meds.   12/18/2020: Patient remains stable, no sx of depression and anxiety and or irritability, stable on current meds   12/23/2020: Patient reports no change from last week, states she forgot to ask about Rx for Klonopin. She states she does not know when she got Rx for it, but she had used ti as needed in the past year with better effect., She states she was taking Ativan as needed when she had one episode of hallucination when she travelled to [place] and is afraid to take Ativan anymore.   3/19/2021: Patient remains stable, no sx of depression and or anxiety reports from last visit. She states she is working more consistently and having trouble focusing and wants to resume Dexedrine again.   7/26/2021:  Patient continues to remains stable. Patient reports She feels current medications are helping.  She states she did not need to take Klonopin since last visit.    08/16/2021: Patient comes today to discuss her meds and recent abnormal hormone levels, she had been anxious about what the abnormal levels mean and how it would affect her to be coming off her psych meds because she feels stable on current med combination. Serum prolactin level is minimally elevated, she denies gynecomastia, galactorrhea, and considering patient is stable and on low dose of Latuda, will wait until patient sees endocrinologist for further assessment before making change to psychotropic meds. patient states she does not want to switch to a different provider at this time as planned previously due to change in insurance coverage  11/08/2021: Patient states knowing prolactin level and MRI barin are normal she feels anxiety sx improved from last visit and states sx of depression are in good control and no mood dysregulation reported since last visit.   2/7/2022: Patient reports that her depression and anxiety symptoms remain in good control with the current medication she however is frustrated and upset that she is not losing weight and though topamax is helping with migraine HA, she does not like it is giving her constipation and is upset that it's not aiding her with weight loss. Stetse Dexedrine helps with ADHD sx and also reducing appetite and managing her weight better  3/4/2022: Patient states she is feeling very anxious, tensed and on edge. She is on max dose of Celexa and did not tolerate well before when she was tapered off it, and she perkins snot want top take Klonopin frequently or daily, discussed increasing Latuda to augment citalopram to help reduce anxiety and worrying and she agrees to a trial, monitor for sx improvement and side effects.   4/08/2022:  Patient did not start using higher dose of Latuda, reports that her mood and anxiety symptoms improved as she came off of Topamax and got her period. She reports that the trazodone is not helping her fall asleep anymore and she's waking up feeling very tired and groggy with a higher dose of trazodone. Sleep hygiene education provided to the patient and advise patient lower trazodone for four days to 50 mg at bedtime and then stop it and start taking doxepin 3 mg HS for insomnia   5/2/2022: Patient stopped taking doxepin because she felt it was not helping and actually made her more anxious.  She is taking trazodone but still continues to have initial insomnia.  We discussed sleep hygiene and that we will try increasing trazodone to 150 mg daily at bedtime.  8/5/2022: Patient reports that increasing trazodone does help with the sleep and she reports she is not feeling depressed and anxiety is in good control and her mood is stable.  11/14/2022: Patient reported that for the past 2 weeks she is experiencing increased anxiety and irritability and poor sleep.  Discussed with the patient increasing Latuda to address mood irritability and she agreed to increase the dose and monitor   12/14/2022: Patient reported that she is feeling better with the reduction in irritability, racing thoughts and anxiety but still not 100% and states that overall she is feeling about 60% improvement and she wants to continue the same dose and continue to monitor for incremental improvement of anxiety symptoms.  2/13/2023: Patient continues to remain stable with good control of symptoms of depression slight increase in anxiety related to psychosocial stressors and in therapy she states learning to examine and change her behavior to manage relationships better.  5/8/2023: Patient reported that she is doing well and reported good control of symptoms of depression and anxiety with current medication regimen, denied panic attacks, denied symptoms suggestive of psychosis and feels that at this time she does not need ADD medications and she is also glad that she has been able to lose weight with the new medication regimen suggested by her primary care physician for weight loss  8/7/2023: Patient continues to remain stable doing well on current medications with good control of symptoms of depression  11/06/2023: Patient remains stable, no symptoms of depression anxiety in good control and tolerating current medications well.  2/2/2024: Patient reported a brief.  Of increase in anxiety after flu and health-related anxiety triggering some baseline anxiety but she took Klonopin at night for a few days with good effect and now off the Klonopin and anxiety remains in good control and overall moods stable and she is not depressed.  5/1/2024: The patient continues to remain stable with good control of depression and anxiety symptoms.   8/5/2024: Patient remained stable with good control of symptoms of depression and anxiety on current medication regimen.  10/09/2024: Patient remains stable, not depressed, no mood dysregulation and anxiety symptoms are in control, reported daytime tiredness, reduced with lowering trazodone, no change in sleep, wants to lower dose further and monitor 12/17/2024: Patient reported some psychosocial stressors contributing to increased anxiety leading up to some cycle sensitive phenomenon like hearing people calling her name or seeing objects in the corner of her eye which worried her and a lot more but that since she and her sister are no longer fighting with her she feels her mood is improving and that she would rather not make any medication changes and instead wants to find a therapist to help her cope with these stressors.  1/27/2025: Patient shows overall improvement with reduced panic attacks and better family relationships. Anxiety symptoms persist but are less severe. Sleep issues continue to be a concern, with difficulty falling asleep and maintaining sleep throughout the night.

## 2025-02-01 NOTE — PLAN
[Medication education provided] : Medication education provided. [Rationale for medication choices, possible risks/precautions, benefits, alternative treatment choices, and consequences of non-treatment discussed] : Rationale for medication choices, possible risks/precautions, benefits, alternative treatment choices, and consequences of non-treatment discussed with patient/family/caregiver  [FreeTextEntry5] : Psychoeducation provided and discussed rationale for recommended meds  Supportive therapy: Offered supportive listening and validation of patient's progress. Sleep hygiene education: Establishing a bedtime routine and avoiding prolonged time in bed when not sleeping, creating a sleep routine, using relaxation techniques, and avoiding clock-watching when having trouble sleeping. Medications: Agreed to increase trazodone dose from 50mg to 75mg (half of a 150mg tablet) for sleep. Patient to try this dosage and report back on effectiveness. Will consider increasing to 100mg if needed. Continue Latuda 40 mg/day, trazodone 150 mg HS, continue Celexa to 40 mg/day, Lamictal 200 mg BID Clonazepam 1 mg once a day as needed for panic attacks. (Patient reported today did not need to take Klonopin since last visit and does not need a prescription today) Continue individual therapy (outside) Emergency procedures were discussed: pt. educated to call 911 or go to nearest ER for worsening of symptoms/suicidal/homicidal ideation.  RTC in 2.5 to 3 months or earlier if not tolerating reduction of trazodone Patient expressed understanding and agreement with above plan.   I stop checked today: Reference #: [id number]  Practitioner Count: 0 Pharmacy Count: 0 Current Opioid Prescriptions: 0 Current Benzodiazepine Prescriptions: 0 Current Stimulant Prescriptions: 0   Patient Demographic Information (PDI)     PDI	First Name	Last Name	Birth Date	Gender	Street Address	[facility]	Zip Code [place]	07/23/1982	Female	[address] [facility]	[id number]  Prescription Information PDI	My Rx	Current Rx	Drug Type	Rx Written	Rx Dispensed	Drug	Quantity	Days Supply	Prescriber Name	Prescriber LAKEISHA #	Payment Method	Dispenser A	Y	N	B	02/02/2024	02/05/2024	clonazepam 1 mg tablet	30	30	Ruma Camargo	[id number]	Insurance	[facility] #[id number]

## 2025-02-01 NOTE — PLAN
[Medication education provided] : Medication education provided. [Rationale for medication choices, possible risks/precautions, benefits, alternative treatment choices, and consequences of non-treatment discussed] : Rationale for medication choices, possible risks/precautions, benefits, alternative treatment choices, and consequences of non-treatment discussed with patient/family/caregiver  [FreeTextEntry5] : Psychoeducation provided and discussed rationale for recommended meds  Supportive therapy: Offered supportive listening and validation of patient's progress. Sleep hygiene education: Establishing a bedtime routine and avoiding prolonged time in bed when not sleeping, creating a sleep routine, using relaxation techniques, and avoiding clock-watching when having trouble sleeping. Medications: Agreed to increase trazodone dose from 50mg to 75mg (half of a 150mg tablet) for sleep. Patient to try this dosage and report back on effectiveness. Will consider increasing to 100mg if needed. Continue Latuda 40 mg/day, trazodone 150 mg HS, continue Celexa to 40 mg/day, Lamictal 200 mg BID Clonazepam 1 mg once a day as needed for panic attacks. (Patient reported today did not need to take Klonopin since last visit and does not need a prescription today) Continue individual therapy (outside) Emergency procedures were discussed: pt. educated to call 911 or go to nearest ER for worsening of symptoms/suicidal/homicidal ideation.  RTC in 2.5 to 3 months or earlier if not tolerating reduction of trazodone Patient expressed understanding and agreement with above plan.   I stop checked today: Reference #: [id number]  Practitioner Count: 0 Pharmacy Count: 0 Current Opioid Prescriptions: 0 Current Benzodiazepine Prescriptions: 0 Current Stimulant Prescriptions: 0   Patient Demographic Information (PDI)     PDI	First Name	Last Name	Birth Date	Gender	Street Address	[facility]	Zip Code [place]	07/23/1982	Female	[address] [facility]	[id number]  Prescription Information PDI	My Rx	Current Rx	Drug Type	Rx Written	Rx Dispensed	Drug	Quantity	Days Supply	Prescriber Name	Prescriber LAKEISHA #	Payment Method	Dispenser A	Y	N	B	02/02/2024	02/05/2024	clonazepam 1 mg tablet	30	30	Ruma Camargo	[id number]	Insurance	[facility] #[id number]

## 2025-02-01 NOTE — REASON FOR VISIT
[Patient preference] : as per patient preference [Telehealth (audio & video) - Individual/Group] : This visit was provided via telehealth using real-time 2-way audio visual technology. [Other Location: e.g. Home (Enter Location, City,State)___] : The provider was located at [unfilled]. [Home] : The patient, [unfilled], was located at home, [unfilled], at the time of the visit. [Patient's space is appropriate for telehealth and maintains privacy/confidentiality.] : Patient's space is appropriate for telehealth and maintains privacy/confidentiality. [Participant(s) identity verified] : Participant(s) identity verified. [Verbal consent obtained from patient/other participant(s)] : Verbal consent for telehealth/telephonic services obtained from patient/other participant(s) [Patient] : Patient [Medical Office: ([facility])___] : The provider was located at the medical office in [unfilled]. [FreeTextEntry1] : depression, anxiety

## 2025-02-01 NOTE — DISCUSSION/SUMMARY
[FreeTextEntry1] : The patient is a 41 yo woman with a long history of recurrent depression, ADHD with onset in childhood. Caregiver stress+.  She reported mood instability and depression and Lamictal was increased on 11/28/18, with brief mood stability, on 2/15/1:presented with worsening sx of depression, lack of motivation and desire to do things, and decline in functioning and cross titration with tapering and stopping Celexa with titrating Zoloft.  4/3/19: she is so far doing well with sertraline switch, but not complete, continue cross taper and  5/7/19: patient called on 4/23/19 to report that she is not doing well and since she lowered Celexa to 20 mg and increased sertraline to 100 mg she is very irritable, and wanted to go back to lower dose of sertraline and increase Celexa. She reports she is doing worse and thinks this is because she has come down of Celexa quickly, though cross taper was over a period of few weeks and she does not appear to be having withdrawal sx, and insists she was doing the best on combination of Celexa 40 mg and sertraline 25 mg/day, and would like to resume that dose combination. She was informed that they both are SSRI and that it is not recommended patient take 2 SSRIs and if her depression worsens, we would have to consider increasing sertraline, and if so, would recommend tapering off Celexa, or stop Sertraline and go back to previous dose of Celexa, though that was no longer effective and hence the cross taper was started. She states she agrees to increase sertraline and lower Celexa and stop it if her depression were to increase, and wants to remains on reduced dose of Celexa 40 mg/day and sertraline 25 mg/day.  Meds: Celexa 40 mg/day and Sertraline 25, Continue Lamictal 200 mg BID. Continue Trazodone 100 mg HS for insomnia, Increase Vyvanse dose to 30 mg/day, DC Klonopin, switch to Ativan 1 mg BID prn for severe anxiety   5/17/19: Patient states she remains depressed as last visit and not irritable, but very "flat" and is taking low dose Celexa and sertraline.  Patient states she is "not sure anymore" and is worried about going up on Celexa and stay on sertraline, "what if that Celexa does not help".  Continue Celexa 20 mg/day and titrate sertraline up 25 mg every 3 days to 100 mg/day Continue Lamictal 200 mg BID. Continue Trazodone 100 mg HS for insomnia, Increase Vyvanse dose to 30 mg/day,  Ativan 1 mg BID prn for severe anxiety (she will call when she needs refill for ativan)  6/3/19 Patient states she is feeling better with current dose of sertraline, but she is worried about gaining weight and wants to be off it, and agrees to stop Celexa if we switched to another medication to help with depression. She also wants to go back to trial of low Dexedrine Psychoeducation and rationale and side effects, risks and benefits of med changes discussed 6/3/19: Stop Celexa  6/4/19 to 6/7/19: Sertraline 75 mg/day, 6/8 to 6/11 sertraline 50 mg/day, 6/12 to 6/15: Sertraline 25 mg/day, then stop, on 6/4/19 start Viibryd 5 mg/day for a week, then increase to 10 mg/day.  STOP Vyvanse, start Dexedrine 2.5 mg BID  Continue Lamictal 200 mg BID. Continue Trazodone 100 mg HS for insomnia,  Ativan 1 mg BID prn for severe anxiety   6/26/19 Patient is off sertraline and feels Viibryd is better, and agrees to taper and stop Celexa and continue Viibryd Continue Lamictal 200 mg BID. Continue Trazodone 100 mg HS for insomnia,  Ativan 1 mg BID prn for severe anxiety  Dexedrine 5 mg BID Discussed with patient potential for tolerance and dependence with BZD and advise patient not change doses by herself.  BP: 126/92, AK=91: monitor BP at home and lose weight  7/25/19: Patient appears to be tolerating reduction of Celexa dose, no irritability, still depressed, and Dexedrine less effective for ADHD  Increase Viibryd to 20 mg/day, taper Celexa 10 mg every other day for a week, then stop Dexedrine 10 mg AM and 5 mg afternoon.  Continue Lamictal 200 mg BID. Continue Trazodone 100 mg HS for insomnia,  Ativan 1 mg BID prn for severe anxiety   8/6/19: Patient has nausea and vomiting when Viibryd dose was increased. She reports feeling depressed, and irritable, increased psychosocial stress- mother with terminal illness Taper and stop Viibryd. Titrate Celexa back to 40 mg/day, will add Latuda to augment Antidepressant.  Continue Dexedrine 10 mg AM and 5 mg afternoon, Lamictal 200 mg BID, continue Trazodone 100 mg HS for insomnia,  Ativan 1 mg BID prn for severe anxiety   09/12/19: Mother passed away on 08/08, patient is grieving mother's loss, and overall stable with current meds.  Celexa 20 mg/day, if she has worsening of depression, increase Celexa to 30 mg/day, agree with not starting Latuda  10/22/19: Patient states she is missing mother, has good and bad days, and reports increased worrying about germs.   12/3/19: Patient reports she feels depressed and anxious and feeling lost and hopeless and helpless.   12/27/19: Patient reports she feels a lot better with reduced depression and anxiety sx with addition of Latuda.   2/6/2020: patient reports she continues to feel better with significant reduction of depression and anxiety sx.   4/21/2020: Patient reports she is doing well since last visit, denies sx of depression, and anxiety is in good control, and denies intrusive thoughts and denies irritability and anger episodes.   7/14/2020: Patient remains stable with good control symptoms on current meds.   09/21/2020: Patient continues to remain stable with good control symptoms on current meds.   12/18/2020: Patient remains stable, no sx of depression and anxiety and or irritability, stable on current meds   12/23/2020: Patient reports no change from last week, states she forgot to ask about Rx for Klonopin. She states she does not know when she got Rx for it, but she had used ti as needed in the past year with better effect., She states she was taking Ativan as needed when she had one episode of hallucination when she travelled to [place] and is afraid to take Ativan anymore.   3/19/2021: Patient remains stable, no sx of depression and or anxiety reports from last visit. She states she is working more consistently and having trouble focusing and wants to resume Dexedrine again.   7/26/2021:  Patient continues to remains stable. Patient reports She feels current medications are helping.  She states she did not need to take Klonopin since last visit.    08/16/2021: Patient comes today to discuss her meds and recent abnormal hormone levels, she had been anxious about what the abnormal levels mean and how it would affect her to be coming off her psych meds because she feels stable on current med combination. Serum prolactin level is minimally elevated, she denies gynecomastia, galactorrhea, and considering patient is stable and on low dose of Latuda, will wait until patient sees endocrinologist for further assessment before making change to psychotropic meds. patient states she does not want to switch to a different provider at this time as planned previously due to change in insurance coverage  11/08/2021: Patient states knowing prolactin level and MRI barin are normal she feels anxiety sx improved from last visit and states sx of depression are in good control and no mood dysregulation reported since last visit.   2/7/2022: Patient reports that her depression and anxiety symptoms remain in good control with the current medication she however is frustrated and upset that she is not losing weight and though topamax is helping with migraine HA, she does not like it is giving her constipation and is upset that it's not aiding her with weight loss. Stetse Dexedrine helps with ADHD sx and also reducing appetite and managing her weight better  3/4/2022: Patient states she is feeling very anxious, tensed and on edge. She is on max dose of Celexa and did not tolerate well before when she was tapered off it, and she perkins snot want top take Klonopin frequently or daily, discussed increasing Latuda to augment citalopram to help reduce anxiety and worrying and she agrees to a trial, monitor for sx improvement and side effects.   4/08/2022:  Patient did not start using higher dose of Latuda, reports that her mood and anxiety symptoms improved as she came off of Topamax and got her period. She reports that the trazodone is not helping her fall asleep anymore and she's waking up feeling very tired and groggy with a higher dose of trazodone. Sleep hygiene education provided to the patient and advise patient lower trazodone for four days to 50 mg at bedtime and then stop it and start taking doxepin 3 mg HS for insomnia   5/2/2022: Patient stopped taking doxepin because she felt it was not helping and actually made her more anxious.  She is taking trazodone but still continues to have initial insomnia.  We discussed sleep hygiene and that we will try increasing trazodone to 150 mg daily at bedtime.  8/5/2022: Patient reports that increasing trazodone does help with the sleep and she reports she is not feeling depressed and anxiety is in good control and her mood is stable.  11/14/2022: Patient reported that for the past 2 weeks she is experiencing increased anxiety and irritability and poor sleep.  Discussed with the patient increasing Latuda to address mood irritability and she agreed to increase the dose and monitor   12/14/2022: Patient reported that she is feeling better with the reduction in irritability, racing thoughts and anxiety but still not 100% and states that overall she is feeling about 60% improvement and she wants to continue the same dose and continue to monitor for incremental improvement of anxiety symptoms.  2/13/2023: Patient continues to remain stable with good control of symptoms of depression slight increase in anxiety related to psychosocial stressors and in therapy she states learning to examine and change her behavior to manage relationships better.  5/8/2023: Patient reported that she is doing well and reported good control of symptoms of depression and anxiety with current medication regimen, denied panic attacks, denied symptoms suggestive of psychosis and feels that at this time she does not need ADD medications and she is also glad that she has been able to lose weight with the new medication regimen suggested by her primary care physician for weight loss  8/7/2023: Patient continues to remain stable doing well on current medications with good control of symptoms of depression  11/06/2023: Patient remains stable, no symptoms of depression anxiety in good control and tolerating current medications well.  2/2/2024: Patient reported a brief.  Of increase in anxiety after flu and health-related anxiety triggering some baseline anxiety but she took Klonopin at night for a few days with good effect and now off the Klonopin and anxiety remains in good control and overall moods stable and she is not depressed.  5/1/2024: The patient continues to remain stable with good control of depression and anxiety symptoms.   8/5/2024: Patient remained stable with good control of symptoms of depression and anxiety on current medication regimen.  10/09/2024: Patient remains stable, not depressed, no mood dysregulation and anxiety symptoms are in control, reported daytime tiredness, reduced with lowering trazodone, no change in sleep, wants to lower dose further and monitor 12/17/2024: Patient reported some psychosocial stressors contributing to increased anxiety leading up to some cycle sensitive phenomenon like hearing people calling her name or seeing objects in the corner of her eye which worried her and a lot more but that since she and her sister are no longer fighting with her she feels her mood is improving and that she would rather not make any medication changes and instead wants to find a therapist to help her cope with these stressors.  1/27/2025: Patient shows overall improvement with reduced panic attacks and better family relationships. Anxiety symptoms persist but are less severe. Sleep issues continue to be a concern, with difficulty falling asleep and maintaining sleep throughout the night.

## 2025-02-01 NOTE — DISCUSSION/SUMMARY
[FreeTextEntry1] : The patient is a 39 yo woman with a long history of recurrent depression, ADHD with onset in childhood. Caregiver stress+.  She reported mood instability and depression and Lamictal was increased on 11/28/18, with brief mood stability, on 2/15/1:presented with worsening sx of depression, lack of motivation and desire to do things, and decline in functioning and cross titration with tapering and stopping Celexa with titrating Zoloft.  4/3/19: she is so far doing well with sertraline switch, but not complete, continue cross taper and  5/7/19: patient called on 4/23/19 to report that she is not doing well and since she lowered Celexa to 20 mg and increased sertraline to 100 mg she is very irritable, and wanted to go back to lower dose of sertraline and increase Celexa. She reports she is doing worse and thinks this is because she has come down of Celexa quickly, though cross taper was over a period of few weeks and she does not appear to be having withdrawal sx, and insists she was doing the best on combination of Celexa 40 mg and sertraline 25 mg/day, and would like to resume that dose combination. She was informed that they both are SSRI and that it is not recommended patient take 2 SSRIs and if her depression worsens, we would have to consider increasing sertraline, and if so, would recommend tapering off Celexa, or stop Sertraline and go back to previous dose of Celexa, though that was no longer effective and hence the cross taper was started. She states she agrees to increase sertraline and lower Celexa and stop it if her depression were to increase, and wants to remains on reduced dose of Celexa 40 mg/day and sertraline 25 mg/day.  Meds: Celexa 40 mg/day and Sertraline 25, Continue Lamictal 200 mg BID. Continue Trazodone 100 mg HS for insomnia, Increase Vyvanse dose to 30 mg/day, DC Klonopin, switch to Ativan 1 mg BID prn for severe anxiety   5/17/19: Patient states she remains depressed as last visit and not irritable, but very "flat" and is taking low dose Celexa and sertraline.  Patient states she is "not sure anymore" and is worried about going up on Celexa and stay on sertraline, "what if that Celexa does not help".  Continue Celexa 20 mg/day and titrate sertraline up 25 mg every 3 days to 100 mg/day Continue Lamictal 200 mg BID. Continue Trazodone 100 mg HS for insomnia, Increase Vyvanse dose to 30 mg/day,  Ativan 1 mg BID prn for severe anxiety (she will call when she needs refill for ativan)  6/3/19 Patient states she is feeling better with current dose of sertraline, but she is worried about gaining weight and wants to be off it, and agrees to stop Celexa if we switched to another medication to help with depression. She also wants to go back to trial of low Dexedrine Psychoeducation and rationale and side effects, risks and benefits of med changes discussed 6/3/19: Stop Celexa  6/4/19 to 6/7/19: Sertraline 75 mg/day, 6/8 to 6/11 sertraline 50 mg/day, 6/12 to 6/15: Sertraline 25 mg/day, then stop, on 6/4/19 start Viibryd 5 mg/day for a week, then increase to 10 mg/day.  STOP Vyvanse, start Dexedrine 2.5 mg BID  Continue Lamictal 200 mg BID. Continue Trazodone 100 mg HS for insomnia,  Ativan 1 mg BID prn for severe anxiety   6/26/19 Patient is off sertraline and feels Viibryd is better, and agrees to taper and stop Celexa and continue Viibryd Continue Lamictal 200 mg BID. Continue Trazodone 100 mg HS for insomnia,  Ativan 1 mg BID prn for severe anxiety  Dexedrine 5 mg BID Discussed with patient potential for tolerance and dependence with BZD and advise patient not change doses by herself.  BP: 126/92, NM=91: monitor BP at home and lose weight  7/25/19: Patient appears to be tolerating reduction of Celexa dose, no irritability, still depressed, and Dexedrine less effective for ADHD  Increase Viibryd to 20 mg/day, taper Celexa 10 mg every other day for a week, then stop Dexedrine 10 mg AM and 5 mg afternoon.  Continue Lamictal 200 mg BID. Continue Trazodone 100 mg HS for insomnia,  Ativan 1 mg BID prn for severe anxiety   8/6/19: Patient has nausea and vomiting when Viibryd dose was increased. She reports feeling depressed, and irritable, increased psychosocial stress- mother with terminal illness Taper and stop Viibryd. Titrate Celexa back to 40 mg/day, will add Latuda to augment Antidepressant.  Continue Dexedrine 10 mg AM and 5 mg afternoon, Lamictal 200 mg BID, continue Trazodone 100 mg HS for insomnia,  Ativan 1 mg BID prn for severe anxiety   09/12/19: Mother passed away on 08/08, patient is grieving mother's loss, and overall stable with current meds.  Celexa 20 mg/day, if she has worsening of depression, increase Celexa to 30 mg/day, agree with not starting Latuda  10/22/19: Patient states she is missing mother, has good and bad days, and reports increased worrying about germs.   12/3/19: Patient reports she feels depressed and anxious and feeling lost and hopeless and helpless.   12/27/19: Patient reports she feels a lot better with reduced depression and anxiety sx with addition of Latuda.   2/6/2020: patient reports she continues to feel better with significant reduction of depression and anxiety sx.   4/21/2020: Patient reports she is doing well since last visit, denies sx of depression, and anxiety is in good control, and denies intrusive thoughts and denies irritability and anger episodes.   7/14/2020: Patient remains stable with good control symptoms on current meds.   09/21/2020: Patient continues to remain stable with good control symptoms on current meds.   12/18/2020: Patient remains stable, no sx of depression and anxiety and or irritability, stable on current meds   12/23/2020: Patient reports no change from last week, states she forgot to ask about Rx for Klonopin. She states she does not know when she got Rx for it, but she had used ti as needed in the past year with better effect., She states she was taking Ativan as needed when she had one episode of hallucination when she travelled to [place] and is afraid to take Ativan anymore.   3/19/2021: Patient remains stable, no sx of depression and or anxiety reports from last visit. She states she is working more consistently and having trouble focusing and wants to resume Dexedrine again.   7/26/2021:  Patient continues to remains stable. Patient reports She feels current medications are helping.  She states she did not need to take Klonopin since last visit.    08/16/2021: Patient comes today to discuss her meds and recent abnormal hormone levels, she had been anxious about what the abnormal levels mean and how it would affect her to be coming off her psych meds because she feels stable on current med combination. Serum prolactin level is minimally elevated, she denies gynecomastia, galactorrhea, and considering patient is stable and on low dose of Latuda, will wait until patient sees endocrinologist for further assessment before making change to psychotropic meds. patient states she does not want to switch to a different provider at this time as planned previously due to change in insurance coverage  11/08/2021: Patient states knowing prolactin level and MRI barin are normal she feels anxiety sx improved from last visit and states sx of depression are in good control and no mood dysregulation reported since last visit.   2/7/2022: Patient reports that her depression and anxiety symptoms remain in good control with the current medication she however is frustrated and upset that she is not losing weight and though topamax is helping with migraine HA, she does not like it is giving her constipation and is upset that it's not aiding her with weight loss. Stetse Dexedrine helps with ADHD sx and also reducing appetite and managing her weight better  3/4/2022: Patient states she is feeling very anxious, tensed and on edge. She is on max dose of Celexa and did not tolerate well before when she was tapered off it, and she perkins snot want top take Klonopin frequently or daily, discussed increasing Latuda to augment citalopram to help reduce anxiety and worrying and she agrees to a trial, monitor for sx improvement and side effects.   4/08/2022:  Patient did not start using higher dose of Latuda, reports that her mood and anxiety symptoms improved as she came off of Topamax and got her period. She reports that the trazodone is not helping her fall asleep anymore and she's waking up feeling very tired and groggy with a higher dose of trazodone. Sleep hygiene education provided to the patient and advise patient lower trazodone for four days to 50 mg at bedtime and then stop it and start taking doxepin 3 mg HS for insomnia   5/2/2022: Patient stopped taking doxepin because she felt it was not helping and actually made her more anxious.  She is taking trazodone but still continues to have initial insomnia.  We discussed sleep hygiene and that we will try increasing trazodone to 150 mg daily at bedtime.  8/5/2022: Patient reports that increasing trazodone does help with the sleep and she reports she is not feeling depressed and anxiety is in good control and her mood is stable.  11/14/2022: Patient reported that for the past 2 weeks she is experiencing increased anxiety and irritability and poor sleep.  Discussed with the patient increasing Latuda to address mood irritability and she agreed to increase the dose and monitor   12/14/2022: Patient reported that she is feeling better with the reduction in irritability, racing thoughts and anxiety but still not 100% and states that overall she is feeling about 60% improvement and she wants to continue the same dose and continue to monitor for incremental improvement of anxiety symptoms.  2/13/2023: Patient continues to remain stable with good control of symptoms of depression slight increase in anxiety related to psychosocial stressors and in therapy she states learning to examine and change her behavior to manage relationships better.  5/8/2023: Patient reported that she is doing well and reported good control of symptoms of depression and anxiety with current medication regimen, denied panic attacks, denied symptoms suggestive of psychosis and feels that at this time she does not need ADD medications and she is also glad that she has been able to lose weight with the new medication regimen suggested by her primary care physician for weight loss  8/7/2023: Patient continues to remain stable doing well on current medications with good control of symptoms of depression  11/06/2023: Patient remains stable, no symptoms of depression anxiety in good control and tolerating current medications well.  2/2/2024: Patient reported a brief.  Of increase in anxiety after flu and health-related anxiety triggering some baseline anxiety but she took Klonopin at night for a few days with good effect and now off the Klonopin and anxiety remains in good control and overall moods stable and she is not depressed.  5/1/2024: The patient continues to remain stable with good control of depression and anxiety symptoms.   8/5/2024: Patient remained stable with good control of symptoms of depression and anxiety on current medication regimen.  10/09/2024: Patient remains stable, not depressed, no mood dysregulation and anxiety symptoms are in control, reported daytime tiredness, reduced with lowering trazodone, no change in sleep, wants to lower dose further and monitor 12/17/2024: Patient reported some psychosocial stressors contributing to increased anxiety leading up to some cycle sensitive phenomenon like hearing people calling her name or seeing objects in the corner of her eye which worried her and a lot more but that since she and her sister are no longer fighting with her she feels her mood is improving and that she would rather not make any medication changes and instead wants to find a therapist to help her cope with these stressors.  1/27/2025: Patient shows overall improvement with reduced panic attacks and better family relationships. Anxiety symptoms persist but are less severe. Sleep issues continue to be a concern, with difficulty falling asleep and maintaining sleep throughout the night.

## 2025-02-01 NOTE — HISTORY OF PRESENT ILLNESS
[FreeTextEntry1] : Patient reports improvement in overall mood since last visit. She is currently in therapy once a week and experiencing reduced panic attacks, though anxiety persists. She describes an 'uneasy feeling' that comes 'out of nowhere', accompanied by stomach discomfort and panicky sensations. These feelings persist and are not easily resolved. Patient reports good mood with no significant changes or depressive symptoms. Patient reports difficulty falling asleep. She takes trazodone 50 mg at 9 PM but may stay awake until midnight. Previously, she would take it at 9:30 PM and fall asleep around 11:30 PM. She experiences interrupted sleep, waking up a couple of times during the night, but can usually fall back asleep within 10 minutes. Patient reports good appetite with no significant changes. Energy, motivation and desire to do things- at her usual level. Psychotic Symptoms: No psychotic symptoms reported or observed. Suicidal/Homicidal/Violent thoughts/intent/plan: denied  Side effects from medications: No side effects from medications reported. Adherence to medication: good  Substance use history: She denied excess ETOH use/abuse, denied cannabis and other substance use/abuse Medical update: continuing to take Wegovy for weight loss, no new medical issues, no new medication since last visit Menstrual periods: regular Psychosocial history: Patient reports improvement in relationships with her sister and family, and her BF and reports better ability to navigate and manage these relationships.

## 2025-02-01 NOTE — DISCUSSION/SUMMARY
[FreeTextEntry1] : The patient is a 41 yo woman with a long history of recurrent depression, ADHD with onset in childhood. Caregiver stress+.  She reported mood instability and depression and Lamictal was increased on 11/28/18, with brief mood stability, on 2/15/1:presented with worsening sx of depression, lack of motivation and desire to do things, and decline in functioning and cross titration with tapering and stopping Celexa with titrating Zoloft.  4/3/19: she is so far doing well with sertraline switch, but not complete, continue cross taper and  5/7/19: patient called on 4/23/19 to report that she is not doing well and since she lowered Celexa to 20 mg and increased sertraline to 100 mg she is very irritable, and wanted to go back to lower dose of sertraline and increase Celexa. She reports she is doing worse and thinks this is because she has come down of Celexa quickly, though cross taper was over a period of few weeks and she does not appear to be having withdrawal sx, and insists she was doing the best on combination of Celexa 40 mg and sertraline 25 mg/day, and would like to resume that dose combination. She was informed that they both are SSRI and that it is not recommended patient take 2 SSRIs and if her depression worsens, we would have to consider increasing sertraline, and if so, would recommend tapering off Celexa, or stop Sertraline and go back to previous dose of Celexa, though that was no longer effective and hence the cross taper was started. She states she agrees to increase sertraline and lower Celexa and stop it if her depression were to increase, and wants to remains on reduced dose of Celexa 40 mg/day and sertraline 25 mg/day.  Meds: Celexa 40 mg/day and Sertraline 25, Continue Lamictal 200 mg BID. Continue Trazodone 100 mg HS for insomnia, Increase Vyvanse dose to 30 mg/day, DC Klonopin, switch to Ativan 1 mg BID prn for severe anxiety   5/17/19: Patient states she remains depressed as last visit and not irritable, but very "flat" and is taking low dose Celexa and sertraline.  Patient states she is "not sure anymore" and is worried about going up on Celexa and stay on sertraline, "what if that Celexa does not help".  Continue Celexa 20 mg/day and titrate sertraline up 25 mg every 3 days to 100 mg/day Continue Lamictal 200 mg BID. Continue Trazodone 100 mg HS for insomnia, Increase Vyvanse dose to 30 mg/day,  Ativan 1 mg BID prn for severe anxiety (she will call when she needs refill for ativan)  6/3/19 Patient states she is feeling better with current dose of sertraline, but she is worried about gaining weight and wants to be off it, and agrees to stop Celexa if we switched to another medication to help with depression. She also wants to go back to trial of low Dexedrine Psychoeducation and rationale and side effects, risks and benefits of med changes discussed 6/3/19: Stop Celexa  6/4/19 to 6/7/19: Sertraline 75 mg/day, 6/8 to 6/11 sertraline 50 mg/day, 6/12 to 6/15: Sertraline 25 mg/day, then stop, on 6/4/19 start Viibryd 5 mg/day for a week, then increase to 10 mg/day.  STOP Vyvanse, start Dexedrine 2.5 mg BID  Continue Lamictal 200 mg BID. Continue Trazodone 100 mg HS for insomnia,  Ativan 1 mg BID prn for severe anxiety   6/26/19 Patient is off sertraline and feels Viibryd is better, and agrees to taper and stop Celexa and continue Viibryd Continue Lamictal 200 mg BID. Continue Trazodone 100 mg HS for insomnia,  Ativan 1 mg BID prn for severe anxiety  Dexedrine 5 mg BID Discussed with patient potential for tolerance and dependence with BZD and advise patient not change doses by herself.  BP: 126/92, DE=91: monitor BP at home and lose weight  7/25/19: Patient appears to be tolerating reduction of Celexa dose, no irritability, still depressed, and Dexedrine less effective for ADHD  Increase Viibryd to 20 mg/day, taper Celexa 10 mg every other day for a week, then stop Dexedrine 10 mg AM and 5 mg afternoon.  Continue Lamictal 200 mg BID. Continue Trazodone 100 mg HS for insomnia,  Ativan 1 mg BID prn for severe anxiety   8/6/19: Patient has nausea and vomiting when Viibryd dose was increased. She reports feeling depressed, and irritable, increased psychosocial stress- mother with terminal illness Taper and stop Viibryd. Titrate Celexa back to 40 mg/day, will add Latuda to augment Antidepressant.  Continue Dexedrine 10 mg AM and 5 mg afternoon, Lamictal 200 mg BID, continue Trazodone 100 mg HS for insomnia,  Ativan 1 mg BID prn for severe anxiety   09/12/19: Mother passed away on 08/08, patient is grieving mother's loss, and overall stable with current meds.  Celexa 20 mg/day, if she has worsening of depression, increase Celexa to 30 mg/day, agree with not starting Latuda  10/22/19: Patient states she is missing mother, has good and bad days, and reports increased worrying about germs.   12/3/19: Patient reports she feels depressed and anxious and feeling lost and hopeless and helpless.   12/27/19: Patient reports she feels a lot better with reduced depression and anxiety sx with addition of Latuda.   2/6/2020: patient reports she continues to feel better with significant reduction of depression and anxiety sx.   4/21/2020: Patient reports she is doing well since last visit, denies sx of depression, and anxiety is in good control, and denies intrusive thoughts and denies irritability and anger episodes.   7/14/2020: Patient remains stable with good control symptoms on current meds.   09/21/2020: Patient continues to remain stable with good control symptoms on current meds.   12/18/2020: Patient remains stable, no sx of depression and anxiety and or irritability, stable on current meds   12/23/2020: Patient reports no change from last week, states she forgot to ask about Rx for Klonopin. She states she does not know when she got Rx for it, but she had used ti as needed in the past year with better effect., She states she was taking Ativan as needed when she had one episode of hallucination when she travelled to [place] and is afraid to take Ativan anymore.   3/19/2021: Patient remains stable, no sx of depression and or anxiety reports from last visit. She states she is working more consistently and having trouble focusing and wants to resume Dexedrine again.   7/26/2021:  Patient continues to remains stable. Patient reports She feels current medications are helping.  She states she did not need to take Klonopin since last visit.    08/16/2021: Patient comes today to discuss her meds and recent abnormal hormone levels, she had been anxious about what the abnormal levels mean and how it would affect her to be coming off her psych meds because she feels stable on current med combination. Serum prolactin level is minimally elevated, she denies gynecomastia, galactorrhea, and considering patient is stable and on low dose of Latuda, will wait until patient sees endocrinologist for further assessment before making change to psychotropic meds. patient states she does not want to switch to a different provider at this time as planned previously due to change in insurance coverage  11/08/2021: Patient states knowing prolactin level and MRI barin are normal she feels anxiety sx improved from last visit and states sx of depression are in good control and no mood dysregulation reported since last visit.   2/7/2022: Patient reports that her depression and anxiety symptoms remain in good control with the current medication she however is frustrated and upset that she is not losing weight and though topamax is helping with migraine HA, she does not like it is giving her constipation and is upset that it's not aiding her with weight loss. Stetse Dexedrine helps with ADHD sx and also reducing appetite and managing her weight better  3/4/2022: Patient states she is feeling very anxious, tensed and on edge. She is on max dose of Celexa and did not tolerate well before when she was tapered off it, and she perkins snot want top take Klonopin frequently or daily, discussed increasing Latuda to augment citalopram to help reduce anxiety and worrying and she agrees to a trial, monitor for sx improvement and side effects.   4/08/2022:  Patient did not start using higher dose of Latuda, reports that her mood and anxiety symptoms improved as she came off of Topamax and got her period. She reports that the trazodone is not helping her fall asleep anymore and she's waking up feeling very tired and groggy with a higher dose of trazodone. Sleep hygiene education provided to the patient and advise patient lower trazodone for four days to 50 mg at bedtime and then stop it and start taking doxepin 3 mg HS for insomnia   5/2/2022: Patient stopped taking doxepin because she felt it was not helping and actually made her more anxious.  She is taking trazodone but still continues to have initial insomnia.  We discussed sleep hygiene and that we will try increasing trazodone to 150 mg daily at bedtime.  8/5/2022: Patient reports that increasing trazodone does help with the sleep and she reports she is not feeling depressed and anxiety is in good control and her mood is stable.  11/14/2022: Patient reported that for the past 2 weeks she is experiencing increased anxiety and irritability and poor sleep.  Discussed with the patient increasing Latuda to address mood irritability and she agreed to increase the dose and monitor   12/14/2022: Patient reported that she is feeling better with the reduction in irritability, racing thoughts and anxiety but still not 100% and states that overall she is feeling about 60% improvement and she wants to continue the same dose and continue to monitor for incremental improvement of anxiety symptoms.  2/13/2023: Patient continues to remain stable with good control of symptoms of depression slight increase in anxiety related to psychosocial stressors and in therapy she states learning to examine and change her behavior to manage relationships better.  5/8/2023: Patient reported that she is doing well and reported good control of symptoms of depression and anxiety with current medication regimen, denied panic attacks, denied symptoms suggestive of psychosis and feels that at this time she does not need ADD medications and she is also glad that she has been able to lose weight with the new medication regimen suggested by her primary care physician for weight loss  8/7/2023: Patient continues to remain stable doing well on current medications with good control of symptoms of depression  11/06/2023: Patient remains stable, no symptoms of depression anxiety in good control and tolerating current medications well.  2/2/2024: Patient reported a brief.  Of increase in anxiety after flu and health-related anxiety triggering some baseline anxiety but she took Klonopin at night for a few days with good effect and now off the Klonopin and anxiety remains in good control and overall moods stable and she is not depressed.  5/1/2024: The patient continues to remain stable with good control of depression and anxiety symptoms.   8/5/2024: Patient remained stable with good control of symptoms of depression and anxiety on current medication regimen.  10/09/2024: Patient remains stable, not depressed, no mood dysregulation and anxiety symptoms are in control, reported daytime tiredness, reduced with lowering trazodone, no change in sleep, wants to lower dose further and monitor 12/17/2024: Patient reported some psychosocial stressors contributing to increased anxiety leading up to some cycle sensitive phenomenon like hearing people calling her name or seeing objects in the corner of her eye which worried her and a lot more but that since she and her sister are no longer fighting with her she feels her mood is improving and that she would rather not make any medication changes and instead wants to find a therapist to help her cope with these stressors.  1/27/2025: Patient shows overall improvement with reduced panic attacks and better family relationships. Anxiety symptoms persist but are less severe. Sleep issues continue to be a concern, with difficulty falling asleep and maintaining sleep throughout the night.

## 2025-02-01 NOTE — REASON FOR VISIT
[Patient preference] : as per patient preference [Telehealth (audio & video) - Individual/Group] : This visit was provided via telehealth using real-time 2-way audio visual technology. [Other Location: e.g. Home (Enter Location, City,State)___] : The provider was located at [unfilled]. [Home] : The patient, [unfilled], was located at home, [unfilled], at the time of the visit. [Patient's space is appropriate for telehealth and maintains privacy/confidentiality.] : Patient's space is appropriate for telehealth and maintains privacy/confidentiality. [Participant(s) identity verified] : Participant(s) identity verified. [Verbal consent obtained from patient/other participant(s)] : Verbal consent for telehealth/telephonic services obtained from patient/other participant(s) [Patient] : Patient [Medical Office: ([facility])___] : The provider was located at the medical office in [unfilled]. [FreeTextEntry1] : depression, anxiety

## 2025-02-06 NOTE — HISTORY OF PRESENT ILLNESS
[FreeTextEntry1] : Ms. RIVERA is a 42-year-old female who returns with regard to hx of Obesity, low end DHEA, and Hypothyroidism, along with Hyperlipidemia. Started on Rosuvastatin  Additional medical history includes that of Crohns disease, lupus (remission - stopped seeing rheumatologist, not currently on any medications) 3 TIA most recent was in 2013 not currently on any anticoagulants but was on prior. Current Neurologist is Dr. Ann. Asthma controlled at this time (Used Kenalog 3 or 4 months ago and has been using it for a few years). Severe allergies, depression and anxiety.   In the past she did also have low end DHEA and loss of libido with prior low AM Cortisol but reevaluation including ACTH stim test showing normal cortisol response. She had started DHEA 50 mg a daily. Does note improvement in libido. DHEA level from July 2023 was at 130 (). Recent level from Sept 2024 shows DHEA low at 27, prior was 47 in April 2024.  Regarding her weight, she continues on Wegovy now at 2.4mg sc weekly.  Tolerating well-she would like to stay with current dose. She does report constipation of which she is taking Miralax. Does have hx of Crohn's.  Current wt. at home is 102 lbs and was 150 lbs in office 1/11/2023. Denies ADR.  She is now maintaining weight.  She is following with nutritionist and maintains [id number] calories. She is dairy free.   Has been trying to increase physical activity with running, walking, stationary biking which she does 2-3x per week. Does daily stretching  ____________________________________________________________________________________________________ Too with hypothyroidism, she started levothyroxine 50 mcg daily in September 2022.  Latest TFTs stable wnl in Sept 2024 with TSH at 0.69, Free T4 at 1.3, and Free T3 at 2.64.  Menses regular  She denies any palpitations, sob, tremors, anxiety, weight changes, temperature intolerance, severe fatigue, change in bowel habits, changes in mood. she also denies skin, hair, or nail changes.  Has no problems with sleeping. ____________________________________________________________________________________________________  She does take Latuda 40 and Trazadone along with Celexa and Lamictal. Too taking Clonazepam as needed. Taking Nutrafol, Allegra, Xyzal, Singulair, rosuvastatin 10 mg.  Psych Dr. Clifford in [place]  Not taking vitamin D.  Is taking Omega 3 two pill daily, coq10 She is thinking to start Huperzine A and MCT oil   Told by psych that Latuda can elevate prl.

## 2025-02-06 NOTE — ADDENDUM
[FreeTextEntry1] : Blood will be drawn in office today.  This note was written by Anel Ji on 02/06/2025 acting as medical scribe for Dr. Barrie Garcia. I, Dr. Barrie Garcia, have read and attest that all the information, medical decision making and discharge instructions within are true and accurate.

## 2025-04-17 NOTE — REASON FOR VISIT
[Patient preference] : as per patient preference [Telehealth (audio & video) - Individual/Group] : This visit was provided via telehealth using real-time 2-way audio visual technology. [Medical Office: ([facility])___] : The provider was located at the medical office in [unfilled]. [Home] : The patient, [unfilled], was located at home, [unfilled], at the time of the visit. [Patient's space is appropriate for telehealth and maintains privacy/confidentiality.] : Patient's space is appropriate for telehealth and maintains privacy/confidentiality. [Participant(s) identity verified] : Participant(s) identity verified. [Verbal consent obtained from patient/other participant(s)] : Verbal consent for telehealth/telephonic services obtained from patient/other participant(s) [Patient] : Patient [FreeTextEntry1] : depression, anxiety

## 2025-04-17 NOTE — PLAN
[Medication education provided] : Medication education provided. [Rationale for medication choices, possible risks/precautions, benefits, alternative treatment choices, and consequences of non-treatment discussed] : Rationale for medication choices, possible risks/precautions, benefits, alternative treatment choices, and consequences of non-treatment discussed with patient/family/caregiver  [FreeTextEntry5] : Psychoeducation provided: The abundance of therapy options and programs, including Dialectical Behavior Therapy (DBT) were discussed. She states she previously did DBT and did not like it.  Supportive therapy: The patient was offered support in finding a new therapist suited to her needs. The patient was given two options for new therapy centers: [facility] and [facility]. Medications: Agreed to increase trazodone dose from 50mg to 75mg (half of a 150mg tablet) for sleep. Patient to try this dosage and report back on effectiveness. Will consider increasing to 100mg if needed. Continue Latuda 40 mg/day, trazodone 150 mg HS, continue Celexa to 40 mg/day, Lamictal 200 mg BID Clonazepam 1 mg once a day as needed for panic attacks. (Patient reported today did not need to take Klonopin since last visit and does not need a prescription today) Continue individual therapy (outside) Emergency procedures were discussed: pt. educated to call 911 or go to nearest ER for worsening of symptoms/suicidal/homicidal ideation.  RTC in 3 months or earlier if not tolerating reduction of trazodone Patient expressed understanding and agreement with above plan.   I stop checked, no controlled substance Rx in past 12 months: Reference #: [id number]

## 2025-04-17 NOTE — DISCUSSION/SUMMARY
[FreeTextEntry1] : The patient is a 39 yo woman with a long history of recurrent depression, ADHD with onset in childhood. Caregiver stress+.  She reported mood instability and depression and Lamictal was increased on 11/28/18, with brief mood stability, on 2/15/1:presented with worsening sx of depression, lack of motivation and desire to do things, and decline in functioning and cross titration with tapering and stopping Celexa with titrating Zoloft.  4/3/19: she is so far doing well with sertraline switch, but not complete, continue cross taper and  5/7/19: patient called on 4/23/19 to report that she is not doing well and since she lowered Celexa to 20 mg and increased sertraline to 100 mg she is very irritable, and wanted to go back to lower dose of sertraline and increase Celexa. She reports she is doing worse and thinks this is because she has come down of Celexa quickly, though cross taper was over a period of few weeks and she does not appear to be having withdrawal sx, and insists she was doing the best on combination of Celexa 40 mg and sertraline 25 mg/day, and would like to resume that dose combination. She was informed that they both are SSRI and that it is not recommended patient take 2 SSRIs and if her depression worsens, we would have to consider increasing sertraline, and if so, would recommend tapering off Celexa, or stop Sertraline and go back to previous dose of Celexa, though that was no longer effective and hence the cross taper was started. She states she agrees to increase sertraline and lower Celexa and stop it if her depression were to increase, and wants to remains on reduced dose of Celexa 40 mg/day and sertraline 25 mg/day.  Meds: Celexa 40 mg/day and Sertraline 25, Continue Lamictal 200 mg BID. Continue Trazodone 100 mg HS for insomnia, Increase Vyvanse dose to 30 mg/day, DC Klonopin, switch to Ativan 1 mg BID prn for severe anxiety   5/17/19: Patient states she remains depressed as last visit and not irritable, but very "flat" and is taking low dose Celexa and sertraline.  Patient states she is "not sure anymore" and is worried about going up on Celexa and stay on sertraline, "what if that Celexa does not help".  Continue Celexa 20 mg/day and titrate sertraline up 25 mg every 3 days to 100 mg/day Continue Lamictal 200 mg BID. Continue Trazodone 100 mg HS for insomnia, Increase Vyvanse dose to 30 mg/day,  Ativan 1 mg BID prn for severe anxiety (she will call when she needs refill for ativan)  6/3/19 Patient states she is feeling better with current dose of sertraline, but she is worried about gaining weight and wants to be off it, and agrees to stop Celexa if we switched to another medication to help with depression. She also wants to go back to trial of low Dexedrine Psychoeducation and rationale and side effects, risks and benefits of med changes discussed 6/3/19: Stop Celexa  6/4/19 to 6/7/19: Sertraline 75 mg/day, 6/8 to 6/11 sertraline 50 mg/day, 6/12 to 6/15: Sertraline 25 mg/day, then stop, on 6/4/19 start Viibryd 5 mg/day for a week, then increase to 10 mg/day.  STOP Vyvanse, start Dexedrine 2.5 mg BID  Continue Lamictal 200 mg BID. Continue Trazodone 100 mg HS for insomnia,  Ativan 1 mg BID prn for severe anxiety   6/26/19 Patient is off sertraline and feels Viibryd is better, and agrees to taper and stop Celexa and continue Viibryd Continue Lamictal 200 mg BID. Continue Trazodone 100 mg HS for insomnia,  Ativan 1 mg BID prn for severe anxiety  Dexedrine 5 mg BID Discussed with patient potential for tolerance and dependence with BZD and advise patient not change doses by herself.  BP: 126/92, WA=91: monitor BP at home and lose weight  7/25/19: Patient appears to be tolerating reduction of Celexa dose, no irritability, still depressed, and Dexedrine less effective for ADHD  Increase Viibryd to 20 mg/day, taper Celexa 10 mg every other day for a week, then stop Dexedrine 10 mg AM and 5 mg afternoon.  Continue Lamictal 200 mg BID. Continue Trazodone 100 mg HS for insomnia,  Ativan 1 mg BID prn for severe anxiety   8/6/19: Patient has nausea and vomiting when Viibryd dose was increased. She reports feeling depressed, and irritable, increased psychosocial stress- mother with terminal illness Taper and stop Viibryd. Titrate Celexa back to 40 mg/day, will add Latuda to augment Antidepressant.  Continue Dexedrine 10 mg AM and 5 mg afternoon, Lamictal 200 mg BID, continue Trazodone 100 mg HS for insomnia,  Ativan 1 mg BID prn for severe anxiety   09/12/19: Mother passed away on 08/08, patient is grieving mother's loss, and overall stable with current meds.  Celexa 20 mg/day, if she has worsening of depression, increase Celexa to 30 mg/day, agree with not starting Latuda  10/22/19: Patient states she is missing mother, has good and bad days, and reports increased worrying about germs.   12/3/19: Patient reports she feels depressed and anxious and feeling lost and hopeless and helpless.   12/27/19: Patient reports she feels a lot better with reduced depression and anxiety sx with addition of Latuda.   2/6/2020: patient reports she continues to feel better with significant reduction of depression and anxiety sx.   4/21/2020: Patient reports she is doing well since last visit, denies sx of depression, and anxiety is in good control, and denies intrusive thoughts and denies irritability and anger episodes.   7/14/2020: Patient remains stable with good control symptoms on current meds.   09/21/2020: Patient continues to remain stable with good control symptoms on current meds.   12/18/2020: Patient remains stable, no sx of depression and anxiety and or irritability, stable on current meds   12/23/2020: Patient reports no change from last week, states she forgot to ask about Rx for Klonopin. She states she does not know when she got Rx for it, but she had used ti as needed in the past year with better effect., She states she was taking Ativan as needed when she had one episode of hallucination when she travelled to [place] and is afraid to take Ativan anymore.   3/19/2021: Patient remains stable, no sx of depression and or anxiety reports from last visit. She states she is working more consistently and having trouble focusing and wants to resume Dexedrine again.   7/26/2021:  Patient continues to remains stable. Patient reports She feels current medications are helping.  She states she did not need to take Klonopin since last visit.    08/16/2021: Patient comes today to discuss her meds and recent abnormal hormone levels, she had been anxious about what the abnormal levels mean and how it would affect her to be coming off her psych meds because she feels stable on current med combination. Serum prolactin level is minimally elevated, she denies gynecomastia, galactorrhea, and considering patient is stable and on low dose of Latuda, will wait until patient sees endocrinologist for further assessment before making change to psychotropic meds. patient states she does not want to switch to a different provider at this time as planned previously due to change in insurance coverage  11/08/2021: Patient states knowing prolactin level and MRI barin are normal she feels anxiety sx improved from last visit and states sx of depression are in good control and no mood dysregulation reported since last visit.   2/7/2022: Patient reports that her depression and anxiety symptoms remain in good control with the current medication she however is frustrated and upset that she is not losing weight and though topamax is helping with migraine HA, she does not like it is giving her constipation and is upset that it's not aiding her with weight loss. Stetse Dexedrine helps with ADHD sx and also reducing appetite and managing her weight better  3/4/2022: Patient states she is feeling very anxious, tensed and on edge. She is on max dose of Celexa and did not tolerate well before when she was tapered off it, and she perkins snot want top take Klonopin frequently or daily, discussed increasing Latuda to augment citalopram to help reduce anxiety and worrying and she agrees to a trial, monitor for sx improvement and side effects.   4/08/2022:  Patient did not start using higher dose of Latuda, reports that her mood and anxiety symptoms improved as she came off of Topamax and got her period. She reports that the trazodone is not helping her fall asleep anymore and she's waking up feeling very tired and groggy with a higher dose of trazodone. Sleep hygiene education provided to the patient and advise patient lower trazodone for four days to 50 mg at bedtime and then stop it and start taking doxepin 3 mg HS for insomnia   5/2/2022: Patient stopped taking doxepin because she felt it was not helping and actually made her more anxious.  She is taking trazodone but still continues to have initial insomnia.  We discussed sleep hygiene and that we will try increasing trazodone to 150 mg daily at bedtime.  8/5/2022: Patient reports that increasing trazodone does help with the sleep and she reports she is not feeling depressed and anxiety is in good control and her mood is stable.  11/14/2022: Patient reported that for the past 2 weeks she is experiencing increased anxiety and irritability and poor sleep.  Discussed with the patient increasing Latuda to address mood irritability and she agreed to increase the dose and monitor   12/14/2022: Patient reported that she is feeling better with the reduction in irritability, racing thoughts and anxiety but still not 100% and states that overall she is feeling about 60% improvement and she wants to continue the same dose and continue to monitor for incremental improvement of anxiety symptoms.  2/13/2023: Patient continues to remain stable with good control of symptoms of depression slight increase in anxiety related to psychosocial stressors and in therapy she states learning to examine and change her behavior to manage relationships better.  5/8/2023: Patient reported that she is doing well and reported good control of symptoms of depression and anxiety with current medication regimen, denied panic attacks, denied symptoms suggestive of psychosis and feels that at this time she does not need ADD medications and she is also glad that she has been able to lose weight with the new medication regimen suggested by her primary care physician for weight loss  8/7/2023: Patient continues to remain stable doing well on current medications with good control of symptoms of depression  11/06/2023: Patient remains stable, no symptoms of depression anxiety in good control and tolerating current medications well.  2/2/2024: Patient reported a brief.  Of increase in anxiety after flu and health-related anxiety triggering some baseline anxiety but she took Klonopin at night for a few days with good effect and now off the Klonopin and anxiety remains in good control and overall moods stable and she is not depressed.  5/1/2024: The patient continues to remain stable with good control of depression and anxiety symptoms.   8/5/2024: Patient remained stable with good control of symptoms of depression and anxiety on current medication regimen.  10/09/2024: Patient remains stable, not depressed, no mood dysregulation and anxiety symptoms are in control, reported daytime tiredness, reduced with lowering trazodone, no change in sleep, wants to lower dose further and monitor 12/17/2024: Patient reported some psychosocial stressors contributing to increased anxiety leading up to some cycle sensitive phenomenon like hearing people calling her name or seeing objects in the corner of her eye which worried her and a lot more but that since she and her sister are no longer fighting with her she feels her mood is improving and that she would rather not make any medication changes and instead wants to find a therapist to help her cope with these stressors.  1/27/2025: Patient shows overall improvement with reduced panic attacks and better family relationships. Anxiety symptoms persist but are less severe. Sleep issues continue to be a concern, with difficulty falling asleep and maintaining sleep throughout the night.  4/9/2025: The patient states she has been doing well overall with controlled anxiety levels and improved mood, thanks to the therapy and medication regimen. However, she is currently seeking a new therapist.

## 2025-04-17 NOTE — DISCUSSION/SUMMARY
[FreeTextEntry1] : The patient is a 41 yo woman with a long history of recurrent depression, ADHD with onset in childhood. Caregiver stress+.  She reported mood instability and depression and Lamictal was increased on 11/28/18, with brief mood stability, on 2/15/1:presented with worsening sx of depression, lack of motivation and desire to do things, and decline in functioning and cross titration with tapering and stopping Celexa with titrating Zoloft.  4/3/19: she is so far doing well with sertraline switch, but not complete, continue cross taper and  5/7/19: patient called on 4/23/19 to report that she is not doing well and since she lowered Celexa to 20 mg and increased sertraline to 100 mg she is very irritable, and wanted to go back to lower dose of sertraline and increase Celexa. She reports she is doing worse and thinks this is because she has come down of Celexa quickly, though cross taper was over a period of few weeks and she does not appear to be having withdrawal sx, and insists she was doing the best on combination of Celexa 40 mg and sertraline 25 mg/day, and would like to resume that dose combination. She was informed that they both are SSRI and that it is not recommended patient take 2 SSRIs and if her depression worsens, we would have to consider increasing sertraline, and if so, would recommend tapering off Celexa, or stop Sertraline and go back to previous dose of Celexa, though that was no longer effective and hence the cross taper was started. She states she agrees to increase sertraline and lower Celexa and stop it if her depression were to increase, and wants to remains on reduced dose of Celexa 40 mg/day and sertraline 25 mg/day.  Meds: Celexa 40 mg/day and Sertraline 25, Continue Lamictal 200 mg BID. Continue Trazodone 100 mg HS for insomnia, Increase Vyvanse dose to 30 mg/day, DC Klonopin, switch to Ativan 1 mg BID prn for severe anxiety   5/17/19: Patient states she remains depressed as last visit and not irritable, but very "flat" and is taking low dose Celexa and sertraline.  Patient states she is "not sure anymore" and is worried about going up on Celexa and stay on sertraline, "what if that Celexa does not help".  Continue Celexa 20 mg/day and titrate sertraline up 25 mg every 3 days to 100 mg/day Continue Lamictal 200 mg BID. Continue Trazodone 100 mg HS for insomnia, Increase Vyvanse dose to 30 mg/day,  Ativan 1 mg BID prn for severe anxiety (she will call when she needs refill for ativan)  6/3/19 Patient states she is feeling better with current dose of sertraline, but she is worried about gaining weight and wants to be off it, and agrees to stop Celexa if we switched to another medication to help with depression. She also wants to go back to trial of low Dexedrine Psychoeducation and rationale and side effects, risks and benefits of med changes discussed 6/3/19: Stop Celexa  6/4/19 to 6/7/19: Sertraline 75 mg/day, 6/8 to 6/11 sertraline 50 mg/day, 6/12 to 6/15: Sertraline 25 mg/day, then stop, on 6/4/19 start Viibryd 5 mg/day for a week, then increase to 10 mg/day.  STOP Vyvanse, start Dexedrine 2.5 mg BID  Continue Lamictal 200 mg BID. Continue Trazodone 100 mg HS for insomnia,  Ativan 1 mg BID prn for severe anxiety   6/26/19 Patient is off sertraline and feels Viibryd is better, and agrees to taper and stop Celexa and continue Viibryd Continue Lamictal 200 mg BID. Continue Trazodone 100 mg HS for insomnia,  Ativan 1 mg BID prn for severe anxiety  Dexedrine 5 mg BID Discussed with patient potential for tolerance and dependence with BZD and advise patient not change doses by herself.  BP: 126/92, DC=91: monitor BP at home and lose weight  7/25/19: Patient appears to be tolerating reduction of Celexa dose, no irritability, still depressed, and Dexedrine less effective for ADHD  Increase Viibryd to 20 mg/day, taper Celexa 10 mg every other day for a week, then stop Dexedrine 10 mg AM and 5 mg afternoon.  Continue Lamictal 200 mg BID. Continue Trazodone 100 mg HS for insomnia,  Ativan 1 mg BID prn for severe anxiety   8/6/19: Patient has nausea and vomiting when Viibryd dose was increased. She reports feeling depressed, and irritable, increased psychosocial stress- mother with terminal illness Taper and stop Viibryd. Titrate Celexa back to 40 mg/day, will add Latuda to augment Antidepressant.  Continue Dexedrine 10 mg AM and 5 mg afternoon, Lamictal 200 mg BID, continue Trazodone 100 mg HS for insomnia,  Ativan 1 mg BID prn for severe anxiety   09/12/19: Mother passed away on 08/08, patient is grieving mother's loss, and overall stable with current meds.  Celexa 20 mg/day, if she has worsening of depression, increase Celexa to 30 mg/day, agree with not starting Latuda  10/22/19: Patient states she is missing mother, has good and bad days, and reports increased worrying about germs.   12/3/19: Patient reports she feels depressed and anxious and feeling lost and hopeless and helpless.   12/27/19: Patient reports she feels a lot better with reduced depression and anxiety sx with addition of Latuda.   2/6/2020: patient reports she continues to feel better with significant reduction of depression and anxiety sx.   4/21/2020: Patient reports she is doing well since last visit, denies sx of depression, and anxiety is in good control, and denies intrusive thoughts and denies irritability and anger episodes.   7/14/2020: Patient remains stable with good control symptoms on current meds.   09/21/2020: Patient continues to remain stable with good control symptoms on current meds.   12/18/2020: Patient remains stable, no sx of depression and anxiety and or irritability, stable on current meds   12/23/2020: Patient reports no change from last week, states she forgot to ask about Rx for Klonopin. She states she does not know when she got Rx for it, but she had used ti as needed in the past year with better effect., She states she was taking Ativan as needed when she had one episode of hallucination when she travelled to [place] and is afraid to take Ativan anymore.   3/19/2021: Patient remains stable, no sx of depression and or anxiety reports from last visit. She states she is working more consistently and having trouble focusing and wants to resume Dexedrine again.   7/26/2021:  Patient continues to remains stable. Patient reports She feels current medications are helping.  She states she did not need to take Klonopin since last visit.    08/16/2021: Patient comes today to discuss her meds and recent abnormal hormone levels, she had been anxious about what the abnormal levels mean and how it would affect her to be coming off her psych meds because she feels stable on current med combination. Serum prolactin level is minimally elevated, she denies gynecomastia, galactorrhea, and considering patient is stable and on low dose of Latuda, will wait until patient sees endocrinologist for further assessment before making change to psychotropic meds. patient states she does not want to switch to a different provider at this time as planned previously due to change in insurance coverage  11/08/2021: Patient states knowing prolactin level and MRI barin are normal she feels anxiety sx improved from last visit and states sx of depression are in good control and no mood dysregulation reported since last visit.   2/7/2022: Patient reports that her depression and anxiety symptoms remain in good control with the current medication she however is frustrated and upset that she is not losing weight and though topamax is helping with migraine HA, she does not like it is giving her constipation and is upset that it's not aiding her with weight loss. Stetse Dexedrine helps with ADHD sx and also reducing appetite and managing her weight better  3/4/2022: Patient states she is feeling very anxious, tensed and on edge. She is on max dose of Celexa and did not tolerate well before when she was tapered off it, and she perkins snot want top take Klonopin frequently or daily, discussed increasing Latuda to augment citalopram to help reduce anxiety and worrying and she agrees to a trial, monitor for sx improvement and side effects.   4/08/2022:  Patient did not start using higher dose of Latuda, reports that her mood and anxiety symptoms improved as she came off of Topamax and got her period. She reports that the trazodone is not helping her fall asleep anymore and she's waking up feeling very tired and groggy with a higher dose of trazodone. Sleep hygiene education provided to the patient and advise patient lower trazodone for four days to 50 mg at bedtime and then stop it and start taking doxepin 3 mg HS for insomnia   5/2/2022: Patient stopped taking doxepin because she felt it was not helping and actually made her more anxious.  She is taking trazodone but still continues to have initial insomnia.  We discussed sleep hygiene and that we will try increasing trazodone to 150 mg daily at bedtime.  8/5/2022: Patient reports that increasing trazodone does help with the sleep and she reports she is not feeling depressed and anxiety is in good control and her mood is stable.  11/14/2022: Patient reported that for the past 2 weeks she is experiencing increased anxiety and irritability and poor sleep.  Discussed with the patient increasing Latuda to address mood irritability and she agreed to increase the dose and monitor   12/14/2022: Patient reported that she is feeling better with the reduction in irritability, racing thoughts and anxiety but still not 100% and states that overall she is feeling about 60% improvement and she wants to continue the same dose and continue to monitor for incremental improvement of anxiety symptoms.  2/13/2023: Patient continues to remain stable with good control of symptoms of depression slight increase in anxiety related to psychosocial stressors and in therapy she states learning to examine and change her behavior to manage relationships better.  5/8/2023: Patient reported that she is doing well and reported good control of symptoms of depression and anxiety with current medication regimen, denied panic attacks, denied symptoms suggestive of psychosis and feels that at this time she does not need ADD medications and she is also glad that she has been able to lose weight with the new medication regimen suggested by her primary care physician for weight loss  8/7/2023: Patient continues to remain stable doing well on current medications with good control of symptoms of depression  11/06/2023: Patient remains stable, no symptoms of depression anxiety in good control and tolerating current medications well.  2/2/2024: Patient reported a brief.  Of increase in anxiety after flu and health-related anxiety triggering some baseline anxiety but she took Klonopin at night for a few days with good effect and now off the Klonopin and anxiety remains in good control and overall moods stable and she is not depressed.  5/1/2024: The patient continues to remain stable with good control of depression and anxiety symptoms.   8/5/2024: Patient remained stable with good control of symptoms of depression and anxiety on current medication regimen.  10/09/2024: Patient remains stable, not depressed, no mood dysregulation and anxiety symptoms are in control, reported daytime tiredness, reduced with lowering trazodone, no change in sleep, wants to lower dose further and monitor 12/17/2024: Patient reported some psychosocial stressors contributing to increased anxiety leading up to some cycle sensitive phenomenon like hearing people calling her name or seeing objects in the corner of her eye which worried her and a lot more but that since she and her sister are no longer fighting with her she feels her mood is improving and that she would rather not make any medication changes and instead wants to find a therapist to help her cope with these stressors.  1/27/2025: Patient shows overall improvement with reduced panic attacks and better family relationships. Anxiety symptoms persist but are less severe. Sleep issues continue to be a concern, with difficulty falling asleep and maintaining sleep throughout the night.  4/9/2025: The patient states she has been doing well overall with controlled anxiety levels and improved mood, thanks to the therapy and medication regimen. However, she is currently seeking a new therapist.

## 2025-04-17 NOTE — PLAN
[Medication education provided] : Medication education provided. [Rationale for medication choices, possible risks/precautions, benefits, alternative treatment choices, and consequences of non-treatment discussed] : Rationale for medication choices, possible risks/precautions, benefits, alternative treatment choices, and consequences of non-treatment discussed with patient/family/caregiver  [FreeTextEntry5] : Psychoeducation provided: The abundance of therapy options and programs, including Dialectical Behavior Therapy (DBT) were discussed. She states she previously did DBT and did not like it.  Supportive therapy: The patient was offered support in finding a new therapist suited to her needs. The patient was given two options for new therapy centers: [facility] and [facility]. Medications: Agreed to increase trazodone dose from 50mg to 75mg (half of a 150mg tablet) for sleep. Patient to try this dosage and report back on effectiveness. Will consider increasing to 100mg if needed. Continue Latuda 40 mg/day, trazodone 150 mg HS, continue Celexa to 40 mg/day, Lamictal 200 mg BID Clonazepam 1 mg once a day as needed for panic attacks. (Patient reported today did not need to take Klonopin since last visit and does not need a prescription today) Continue individual therapy (outside) Emergency procedures were discussed: pt. educated to call 911 or go to nearest ER for worsening of symptoms/suicidal/homicidal ideation.  RTC in 3 months or earlier if not tolerating reduction of trazodone Patient expressed understanding and agreement with above plan.   I stop checked, no controlled substance Rx in past 12 months: Reference #: [id number]

## 2025-04-17 NOTE — HISTORY OF PRESENT ILLNESS
[FreeTextEntry1] : The patient states she has been doing better with sustained improvement in mood. Although, she has stopped therapy for a month due to failure to connect with her therapist and is now actively seeking a new one. The patient reports a decrease in anxiety and panic attacks. The patient states she is sleeping well and taking trazodone at a dose of 75mg which helps her fall and stay asleep. No changes in appetite were reported No reported symptoms of psychosis. No suicidal thoughts reported. Side effects from medications: No reported side effects from her medications. Adherence to medication: The patient is adherent to the prescribed medication regimen.  Substance use history: none reported  Medical update: No new physical health issues, over-the-counter medication, or new prescriptions were reported. The patient reports no irregularities with her menstrual periods. Psychosocial history: The patient seems to be managing her relationships well.

## 2025-04-17 NOTE — REASON FOR VISIT
[Patient preference] : as per patient preference [Telehealth (audio & video) - Individual/Group] : This visit was provided via telehealth using real-time 2-way audio visual technology. [Medical Office: ([facility])___] : The provider was located at the medical office in [unfilled]. [Home] : The patient, [unfilled], was located at home, [unfilled], at the time of the visit. [Patient's space is appropriate for telehealth and maintains privacy/confidentiality.] : Patient's space is appropriate for telehealth and maintains privacy/confidentiality. [Participant(s) identity verified] : Participant(s) identity verified. [Verbal consent obtained from patient/other participant(s)] : Verbal consent for telehealth/telephonic services obtained from patient/other participant(s) [Patient] : Patient [FreeTextEntry1] : depression, anxiety

## 2025-06-18 NOTE — PLAN
[Medication education provided] : Medication education provided. [Rationale for medication choices, possible risks/precautions, benefits, alternative treatment choices, and consequences of non-treatment discussed] : Rationale for medication choices, possible risks/precautions, benefits, alternative treatment choices, and consequences of non-treatment discussed with patient/family/caregiver  [FreeTextEntry5] : Psychoeducation provided: The abundance of therapy options and programs, including Dialectical Behavior Therapy (DBT) were discussed. She states she previously did DBT and did not like it.  Supportive therapy: The patient was offered support in finding a new therapist suited to her needs. The patient was given two options for new therapy centers: [facility] and [facility]. Medications: Agreed to increase trazodone dose from 50mg to 75mg (half of a 150mg tablet) for sleep. Patient to try this dosage and report back on effectiveness. Will consider increasing to 100mg if needed. Continue Latuda 40 mg/day, trazodone 150 mg HS, continue Celexa to 40 mg/day, Lamictal 200 mg BID Clonazepam 1 mg once a day as needed for panic attacks. (Patient reported today did not need to take Klonopin since last visit and does not need a prescription today) Continue individual therapy (outside) Emergency procedures were discussed: pt. educated to call 911 or go to nearest ER for worsening of symptoms/suicidal/homicidal ideation.  RTC in 3 months or earlier if not tolerating reduction of trazodone Patient expressed understanding and agreement with above plan.   I stop checked: Reference #: [id number]  Practitioner Count: 1 Pharmacy Count: 1 Current Opioid Prescriptions: 1 Current Benzodiazepine Prescriptions: 0 Current Stimulant Prescriptions: 0   Patient Demographic Information (PDI)     PDI	First Name	Last Name	Birth Date	Gender	Street Address	[facility] Code ALEX Braga	07/23/1982	Female	[address] [facility]	[id number]  Prescription Information    PDI Filter:   PDI	My Rx	Current Rx	Drug Type	Rx Written	Rx Dispensed	Drug	Quantity	Days Supply	Prescriber Name	Prescriber LAKEISHA #	Payment Method	Dispenser A	N	Y	O	06/13/2025	06/13/2025	oxycodone-acetaminophen 5-325 mg tablet	15	5	Prem Wright)	[id number]	Insurance	[facility] #[id number]

## 2025-06-18 NOTE — PHYSICAL EXAM
[None] : none [Average] : average [Euthymic] : euthymic [Cooperative] : cooperative [Full] : full [Clear] : clear [Linear/Goal Directed] : linear/goal directed [None Reported] : none reported [WNL] : within normal limits [FreeTextEntry7] : No SI/HI

## 2025-06-18 NOTE — REASON FOR VISIT
[Patient preference] : as per patient preference [Telehealth (audio & video) - Individual/Group] : This visit was provided via telehealth using real-time 2-way audio visual technology. [Medical Office: ([facility])___] : The provider was located at the medical office in [unfilled]. [Home] : The patient, [unfilled], was located at home, [unfilled], at the time of the visit. [Patient's space is appropriate for telehealth and maintains privacy/confidentiality.] : Patient's space is appropriate for telehealth and maintains privacy/confidentiality. [Participant(s) identity verified] : Participant(s) identity verified. [Verbal consent obtained from patient/other participant(s)] : Verbal consent for telehealth/telephonic services obtained from patient/other participant(s) [Patient] : Patient [FreeTextEntry1] : depression, anxiety

## 2025-06-18 NOTE — HISTORY OF PRESENT ILLNESS
[FreeTextEntry1] :  Interval history:   -  Patient reports recent breakup with boyfriend on their one-year anniversary. She has also been dealing with a bladder infection for the past two weeks, currently on her last antibiotic before potentially needing IV treatment. Patient is starting to feel better physically.   Mood:   - Patient's mood appears to have improved since the initial shock of the breakup. She states, 'I'm okay now' after admitting the first week was difficult.   Anxiety:   -  Patient requested a refill of Klonopin, indicating some ongoing anxiety. She reports taking one dose during the first night after the breakup but hasn't needed it since then.   Sleep:   -  Patient reports needing to take trazodone for sleep, suggesting some sleep disturbances.   Appetite:   - Patient reports that 'Everything's good' in terms of appetite.   Energy:   - Patient has been mostly bedridden due to the bladder infection, but is starting to feel better.   Motivation:   - No specific information provided about motivation.   Attention:   - No specific information provided about attention.   Level of activity:   - Patient's activity level has been significantly reduced due to the bladder infection, stating 'I've really been in bed.'   Psychotic Symptoms:   - No psychotic symptoms reported or observed.   Suicidal/Homicidal/Violent thoughts/intent/plan:   - No suicidal, homicidal, or violent thoughts reported.   Side effects from medications:   - No side effects from medications reported.   Adherence to medication:   - Patient appears to be adhering to her medication regimen, including antibiotics and psychiatric medications.   Substance use history:   -  No substance use reported.   Medical update:   -  Patient is being treated for a bladder infection. Currently on phosphomycin, her last antibiotic before potentially needing IV treatment. Fever is improving.   Psychosocial history:   -  Patient experienced a breakup with her boyfriend of one year on May 31st. She is receiving support from her sister and friends.   Summary:   -  Brunilda Braga is a patient with a history of depression and anxiety who is currently dealing with a recent breakup and a bladder infection. She is showing resilience in coping with these stressors and her mood appears to be improving. She continues to take her prescribed medications, including trazodone for sleep. The patient has requested a refill of Klonopin for anxiety management. Her physical health is improving with antibiotic treatment.   Plan:   - Psychoeducation provided:     - Discussed the importance of continuing current medications and the potential interactions between antibiotics and antidepressants.   - Supportive therapy: Provided supportive listening regarding the recent breakup and ongoing health issues.   - Lifestyle changes reviewed: Encouraged continued rest and hydration while recovering from the bladder infection.   - Medications:     - Continuing current medications including Viibryd and trazodone.     - Renewing Klonopin prescription for as-needed use.     - Patient to continue antibiotics as prescribed for bladder infection.   - Psychotherapy recommendations: Patient expressed interest in DBT but is still looking for a therapist. Encouraged to continue this search.   - Follow ups: Next appointment scheduled for September, with the option to schedule earlier if needed.   - Agreement: Patient agreed to the treatment plan and medication renewals.

## 2025-06-18 NOTE — DISCUSSION/SUMMARY
[FreeTextEntry1] : The patient is a 39 yo woman with a long history of recurrent depression, ADHD with onset in childhood. Caregiver stress+.  She reported mood instability and depression and Lamictal was increased on 11/28/18, with brief mood stability, on 2/15/1:presented with worsening sx of depression, lack of motivation and desire to do things, and decline in functioning and cross titration with tapering and stopping Celexa with titrating Zoloft.  4/3/19: she is so far doing well with sertraline switch, but not complete, continue cross taper and  5/7/19: patient called on 4/23/19 to report that she is not doing well and since she lowered Celexa to 20 mg and increased sertraline to 100 mg she is very irritable, and wanted to go back to lower dose of sertraline and increase Celexa. She reports she is doing worse and thinks this is because she has come down of Celexa quickly, though cross taper was over a period of few weeks and she does not appear to be having withdrawal sx, and insists she was doing the best on combination of Celexa 40 mg and sertraline 25 mg/day, and would like to resume that dose combination. She was informed that they both are SSRI and that it is not recommended patient take 2 SSRIs and if her depression worsens, we would have to consider increasing sertraline, and if so, would recommend tapering off Celexa, or stop Sertraline and go back to previous dose of Celexa, though that was no longer effective and hence the cross taper was started. She states she agrees to increase sertraline and lower Celexa and stop it if her depression were to increase, and wants to remains on reduced dose of Celexa 40 mg/day and sertraline 25 mg/day.  Meds: Celexa 40 mg/day and Sertraline 25, Continue Lamictal 200 mg BID. Continue Trazodone 100 mg HS for insomnia, Increase Vyvanse dose to 30 mg/day, DC Klonopin, switch to Ativan 1 mg BID prn for severe anxiety   5/17/19: Patient states she remains depressed as last visit and not irritable, but very "flat" and is taking low dose Celexa and sertraline.  Patient states she is "not sure anymore" and is worried about going up on Celexa and stay on sertraline, "what if that Celexa does not help".  Continue Celexa 20 mg/day and titrate sertraline up 25 mg every 3 days to 100 mg/day Continue Lamictal 200 mg BID. Continue Trazodone 100 mg HS for insomnia, Increase Vyvanse dose to 30 mg/day,  Ativan 1 mg BID prn for severe anxiety (she will call when she needs refill for ativan)  6/3/19 Patient states she is feeling better with current dose of sertraline, but she is worried about gaining weight and wants to be off it, and agrees to stop Celexa if we switched to another medication to help with depression. She also wants to go back to trial of low Dexedrine Psychoeducation and rationale and side effects, risks and benefits of med changes discussed 6/3/19: Stop Celexa  6/4/19 to 6/7/19: Sertraline 75 mg/day, 6/8 to 6/11 sertraline 50 mg/day, 6/12 to 6/15: Sertraline 25 mg/day, then stop, on 6/4/19 start Viibryd 5 mg/day for a week, then increase to 10 mg/day.  STOP Vyvanse, start Dexedrine 2.5 mg BID  Continue Lamictal 200 mg BID. Continue Trazodone 100 mg HS for insomnia,  Ativan 1 mg BID prn for severe anxiety   6/26/19 Patient is off sertraline and feels Viibryd is better, and agrees to taper and stop Celexa and continue Viibryd Continue Lamictal 200 mg BID. Continue Trazodone 100 mg HS for insomnia,  Ativan 1 mg BID prn for severe anxiety  Dexedrine 5 mg BID Discussed with patient potential for tolerance and dependence with BZD and advise patient not change doses by herself.  BP: 126/92, AL=91: monitor BP at home and lose weight  7/25/19: Patient appears to be tolerating reduction of Celexa dose, no irritability, still depressed, and Dexedrine less effective for ADHD  Increase Viibryd to 20 mg/day, taper Celexa 10 mg every other day for a week, then stop Dexedrine 10 mg AM and 5 mg afternoon.  Continue Lamictal 200 mg BID. Continue Trazodone 100 mg HS for insomnia,  Ativan 1 mg BID prn for severe anxiety   8/6/19: Patient has nausea and vomiting when Viibryd dose was increased. She reports feeling depressed, and irritable, increased psychosocial stress- mother with terminal illness Taper and stop Viibryd. Titrate Celexa back to 40 mg/day, will add Latuda to augment Antidepressant.  Continue Dexedrine 10 mg AM and 5 mg afternoon, Lamictal 200 mg BID, continue Trazodone 100 mg HS for insomnia,  Ativan 1 mg BID prn for severe anxiety   09/12/19: Mother passed away on 08/08, patient is grieving mother's loss, and overall stable with current meds.  Celexa 20 mg/day, if she has worsening of depression, increase Celexa to 30 mg/day, agree with not starting Latuda  10/22/19: Patient states she is missing mother, has good and bad days, and reports increased worrying about germs.   12/3/19: Patient reports she feels depressed and anxious and feeling lost and hopeless and helpless.   12/27/19: Patient reports she feels a lot better with reduced depression and anxiety sx with addition of Latuda.   2/6/2020: patient reports she continues to feel better with significant reduction of depression and anxiety sx.   4/21/2020: Patient reports she is doing well since last visit, denies sx of depression, and anxiety is in good control, and denies intrusive thoughts and denies irritability and anger episodes.   7/14/2020: Patient remains stable with good control symptoms on current meds.   09/21/2020: Patient continues to remain stable with good control symptoms on current meds.   12/18/2020: Patient remains stable, no sx of depression and anxiety and or irritability, stable on current meds   12/23/2020: Patient reports no change from last week, states she forgot to ask about Rx for Klonopin. She states she does not know when she got Rx for it, but she had used ti as needed in the past year with better effect., She states she was taking Ativan as needed when she had one episode of hallucination when she travelled to [place] and is afraid to take Ativan anymore.   3/19/2021: Patient remains stable, no sx of depression and or anxiety reports from last visit. She states she is working more consistently and having trouble focusing and wants to resume Dexedrine again.   7/26/2021:  Patient continues to remains stable. Patient reports She feels current medications are helping.  She states she did not need to take Klonopin since last visit.    08/16/2021: Patient comes today to discuss her meds and recent abnormal hormone levels, she had been anxious about what the abnormal levels mean and how it would affect her to be coming off her psych meds because she feels stable on current med combination. Serum prolactin level is minimally elevated, she denies gynecomastia, galactorrhea, and considering patient is stable and on low dose of Latuda, will wait until patient sees endocrinologist for further assessment before making change to psychotropic meds. patient states she does not want to switch to a different provider at this time as planned previously due to change in insurance coverage  11/08/2021: Patient states knowing prolactin level and MRI barin are normal she feels anxiety sx improved from last visit and states sx of depression are in good control and no mood dysregulation reported since last visit.   2/7/2022: Patient reports that her depression and anxiety symptoms remain in good control with the current medication she however is frustrated and upset that she is not losing weight and though topamax is helping with migraine HA, she does not like it is giving her constipation and is upset that it's not aiding her with weight loss. Stetse Dexedrine helps with ADHD sx and also reducing appetite and managing her weight better  3/4/2022: Patient states she is feeling very anxious, tensed and on edge. She is on max dose of Celexa and did not tolerate well before when she was tapered off it, and she perkins snot want top take Klonopin frequently or daily, discussed increasing Latuda to augment citalopram to help reduce anxiety and worrying and she agrees to a trial, monitor for sx improvement and side effects.   4/08/2022:  Patient did not start using higher dose of Latuda, reports that her mood and anxiety symptoms improved as she came off of Topamax and got her period. She reports that the trazodone is not helping her fall asleep anymore and she's waking up feeling very tired and groggy with a higher dose of trazodone. Sleep hygiene education provided to the patient and advise patient lower trazodone for four days to 50 mg at bedtime and then stop it and start taking doxepin 3 mg HS for insomnia   5/2/2022: Patient stopped taking doxepin because she felt it was not helping and actually made her more anxious.  She is taking trazodone but still continues to have initial insomnia.  We discussed sleep hygiene and that we will try increasing trazodone to 150 mg daily at bedtime.  8/5/2022: Patient reports that increasing trazodone does help with the sleep and she reports she is not feeling depressed and anxiety is in good control and her mood is stable.  11/14/2022: Patient reported that for the past 2 weeks she is experiencing increased anxiety and irritability and poor sleep.  Discussed with the patient increasing Latuda to address mood irritability and she agreed to increase the dose and monitor   12/14/2022: Patient reported that she is feeling better with the reduction in irritability, racing thoughts and anxiety but still not 100% and states that overall she is feeling about 60% improvement and she wants to continue the same dose and continue to monitor for incremental improvement of anxiety symptoms.  2/13/2023: Patient continues to remain stable with good control of symptoms of depression slight increase in anxiety related to psychosocial stressors and in therapy she states learning to examine and change her behavior to manage relationships better.  5/8/2023: Patient reported that she is doing well and reported good control of symptoms of depression and anxiety with current medication regimen, denied panic attacks, denied symptoms suggestive of psychosis and feels that at this time she does not need ADD medications and she is also glad that she has been able to lose weight with the new medication regimen suggested by her primary care physician for weight loss  8/7/2023: Patient continues to remain stable doing well on current medications with good control of symptoms of depression  11/06/2023: Patient remains stable, no symptoms of depression anxiety in good control and tolerating current medications well.  2/2/2024: Patient reported a brief.  Of increase in anxiety after flu and health-related anxiety triggering some baseline anxiety but she took Klonopin at night for a few days with good effect and now off the Klonopin and anxiety remains in good control and overall moods stable and she is not depressed.  5/1/2024: The patient continues to remain stable with good control of depression and anxiety symptoms.   8/5/2024: Patient remained stable with good control of symptoms of depression and anxiety on current medication regimen.  10/09/2024: Patient remains stable, not depressed, no mood dysregulation and anxiety symptoms are in control, reported daytime tiredness, reduced with lowering trazodone, no change in sleep, wants to lower dose further and monitor 12/17/2024: Patient reported some psychosocial stressors contributing to increased anxiety leading up to some cycle sensitive phenomenon like hearing people calling her name or seeing objects in the corner of her eye which worried her and a lot more but that since she and her sister are no longer fighting with her she feels her mood is improving and that she would rather not make any medication changes and instead wants to find a therapist to help her cope with these stressors.  1/27/2025: Patient shows overall improvement with reduced panic attacks and better family relationships. Anxiety symptoms persist but are less severe. Sleep issues continue to be a concern, with difficulty falling asleep and maintaining sleep throughout the night.  4/9/2025: The patient states she has been doing well overall with controlled anxiety levels and improved mood, thanks to the therapy and medication regimen. However, she is currently seeking a new therapist. 6/18/2025:

## 2025-06-18 NOTE — DISCUSSION/SUMMARY
[FreeTextEntry1] : The patient is a 39 yo woman with a long history of recurrent depression, ADHD with onset in childhood. Caregiver stress+.  She reported mood instability and depression and Lamictal was increased on 11/28/18, with brief mood stability, on 2/15/1:presented with worsening sx of depression, lack of motivation and desire to do things, and decline in functioning and cross titration with tapering and stopping Celexa with titrating Zoloft.  4/3/19: she is so far doing well with sertraline switch, but not complete, continue cross taper and  5/7/19: patient called on 4/23/19 to report that she is not doing well and since she lowered Celexa to 20 mg and increased sertraline to 100 mg she is very irritable, and wanted to go back to lower dose of sertraline and increase Celexa. She reports she is doing worse and thinks this is because she has come down of Celexa quickly, though cross taper was over a period of few weeks and she does not appear to be having withdrawal sx, and insists she was doing the best on combination of Celexa 40 mg and sertraline 25 mg/day, and would like to resume that dose combination. She was informed that they both are SSRI and that it is not recommended patient take 2 SSRIs and if her depression worsens, we would have to consider increasing sertraline, and if so, would recommend tapering off Celexa, or stop Sertraline and go back to previous dose of Celexa, though that was no longer effective and hence the cross taper was started. She states she agrees to increase sertraline and lower Celexa and stop it if her depression were to increase, and wants to remains on reduced dose of Celexa 40 mg/day and sertraline 25 mg/day.  Meds: Celexa 40 mg/day and Sertraline 25, Continue Lamictal 200 mg BID. Continue Trazodone 100 mg HS for insomnia, Increase Vyvanse dose to 30 mg/day, DC Klonopin, switch to Ativan 1 mg BID prn for severe anxiety   5/17/19: Patient states she remains depressed as last visit and not irritable, but very "flat" and is taking low dose Celexa and sertraline.  Patient states she is "not sure anymore" and is worried about going up on Celexa and stay on sertraline, "what if that Celexa does not help".  Continue Celexa 20 mg/day and titrate sertraline up 25 mg every 3 days to 100 mg/day Continue Lamictal 200 mg BID. Continue Trazodone 100 mg HS for insomnia, Increase Vyvanse dose to 30 mg/day,  Ativan 1 mg BID prn for severe anxiety (she will call when she needs refill for ativan)  6/3/19 Patient states she is feeling better with current dose of sertraline, but she is worried about gaining weight and wants to be off it, and agrees to stop Celexa if we switched to another medication to help with depression. She also wants to go back to trial of low Dexedrine Psychoeducation and rationale and side effects, risks and benefits of med changes discussed 6/3/19: Stop Celexa  6/4/19 to 6/7/19: Sertraline 75 mg/day, 6/8 to 6/11 sertraline 50 mg/day, 6/12 to 6/15: Sertraline 25 mg/day, then stop, on 6/4/19 start Viibryd 5 mg/day for a week, then increase to 10 mg/day.  STOP Vyvanse, start Dexedrine 2.5 mg BID  Continue Lamictal 200 mg BID. Continue Trazodone 100 mg HS for insomnia,  Ativan 1 mg BID prn for severe anxiety   6/26/19 Patient is off sertraline and feels Viibryd is better, and agrees to taper and stop Celexa and continue Viibryd Continue Lamictal 200 mg BID. Continue Trazodone 100 mg HS for insomnia,  Ativan 1 mg BID prn for severe anxiety  Dexedrine 5 mg BID Discussed with patient potential for tolerance and dependence with BZD and advise patient not change doses by herself.  BP: 126/92, ID=[id number]: monitor BP at home and lose weight  7/25/19: Patient appears to be tolerating reduction of Celexa dose, no irritability, still depressed, and Dexedrine less effective for ADHD  Increase Viibryd to 20 mg/day, taper Celexa 10 mg every other day for a week, then stop Dexedrine 10 mg AM and 5 mg afternoon.  Continue Lamictal 200 mg BID. Continue Trazodone 100 mg HS for insomnia,  Ativan 1 mg BID prn for severe anxiety   8/6/19: Patient has nausea and vomiting when Viibryd dose was increased. She reports feeling depressed, and irritable, increased psychosocial stress- mother with terminal illness Taper and stop Viibryd. Titrate Celexa back to 40 mg/day, will add Latuda to augment Antidepressant.  Continue Dexedrine 10 mg AM and 5 mg afternoon, Lamictal 200 mg BID, continue Trazodone 100 mg HS for insomnia,  Ativan 1 mg BID prn for severe anxiety   09/12/19: Mother passed away on 08/08, patient is grieving mother's loss, and overall stable with current meds.  Celexa 20 mg/day, if she has worsening of depression, increase Celexa to 30 mg/day, agree with not starting Latuda  10/22/19: Patient states she is missing mother, has good and bad days, and reports increased worrying about germs.   12/3/19: Patient reports she feels depressed and anxious and feeling lost and hopeless and helpless.   12/27/19: Patient reports she feels a lot better with reduced depression and anxiety sx with addition of Latuda.   2/6/2020: patient reports she continues to feel better with significant reduction of depression and anxiety sx.   4/21/2020: Patient reports she is doing well since last visit, denies sx of depression, and anxiety is in good control, and denies intrusive thoughts and denies irritability and anger episodes.   7/14/2020: Patient remains stable with good control symptoms on current meds.   09/21/2020: Patient continues to remain stable with good control symptoms on current meds.   12/18/2020: Patient remains stable, no sx of depression and anxiety and or irritability, stable on current meds   12/23/2020: Patient reports no change from last week, states she forgot to ask about Rx for Klonopin. She states she does not know when she got Rx for it, but she had used ti as needed in the past year with better effect., She states she was taking Ativan as needed when she had one episode of hallucination when she travelled to [place] and is afraid to take Ativan anymore.   3/19/2021: Patient remains stable, no sx of depression and or anxiety reports from last visit. She states she is working more consistently and having trouble focusing and wants to resume Dexedrine again.   7/26/2021:  Patient continues to remains stable. Patient reports She feels current medications are helping.  She states she did not need to take Klonopin since last visit.    08/16/2021: Patient comes today to discuss her meds and recent abnormal hormone levels, she had been anxious about what the abnormal levels mean and how it would affect her to be coming off her psych meds because she feels stable on current med combination. Serum prolactin level is minimally elevated, she denies gynecomastia, galactorrhea, and considering patient is stable and on low dose of Latuda, will wait until patient sees endocrinologist for further assessment before making change to psychotropic meds. patient states she does not want to switch to a different provider at this time as planned previously due to change in insurance coverage  11/08/2021: Patient states knowing prolactin level and MRI barin are normal she feels anxiety sx improved from last visit and states sx of depression are in good control and no mood dysregulation reported since last visit.   2/7/2022: Patient reports that her depression and anxiety symptoms remain in good control with the current medication she however is frustrated and upset that she is not losing weight and though topamax is helping with migraine HA, she does not like it is giving her constipation and is upset that it's not aiding her with weight loss. Stetse Dexedrine helps with ADHD sx and also reducing appetite and managing her weight better  3/4/2022: Patient states she is feeling very anxious, tensed and on edge. She is on max dose of Celexa and did not tolerate well before when she was tapered off it, and she perkins snot want top take Klonopin frequently or daily, discussed increasing Latuda to augment citalopram to help reduce anxiety and worrying and she agrees to a trial, monitor for sx improvement and side effects.   4/08/2022:  Patient did not start using higher dose of Latuda, reports that her mood and anxiety symptoms improved as she came off of Topamax and got her period. She reports that the trazodone is not helping her fall asleep anymore and she's waking up feeling very tired and groggy with a higher dose of trazodone. Sleep hygiene education provided to the patient and advise patient lower trazodone for four days to 50 mg at bedtime and then stop it and start taking doxepin 3 mg HS for insomnia   5/2/2022: Patient stopped taking doxepin because she felt it was not helping and actually made her more anxious.  She is taking trazodone but still continues to have initial insomnia.  We discussed sleep hygiene and that we will try increasing trazodone to 150 mg daily at bedtime.  8/5/2022: Patient reports that increasing trazodone does help with the sleep and she reports she is not feeling depressed and anxiety is in good control and her mood is stable.  11/14/2022: Patient reported that for the past 2 weeks she is experiencing increased anxiety and irritability and poor sleep.  Discussed with the patient increasing Latuda to address mood irritability and she agreed to increase the dose and monitor   12/14/2022: Patient reported that she is feeling better with the reduction in irritability, racing thoughts and anxiety but still not 100% and states that overall she is feeling about 60% improvement and she wants to continue the same dose and continue to monitor for incremental improvement of anxiety symptoms.  2/13/2023: Patient continues to remain stable with good control of symptoms of depression slight increase in anxiety related to psychosocial stressors and in therapy she states learning to examine and change her behavior to manage relationships better.  5/8/2023: Patient reported that she is doing well and reported good control of symptoms of depression and anxiety with current medication regimen, denied panic attacks, denied symptoms suggestive of psychosis and feels that at this time she does not need ADD medications and she is also glad that she has been able to lose weight with the new medication regimen suggested by her primary care physician for weight loss  8/7/2023: Patient continues to remain stable doing well on current medications with good control of symptoms of depression  11/06/2023: Patient remains stable, no symptoms of depression anxiety in good control and tolerating current medications well.  2/2/2024: Patient reported a brief.  Of increase in anxiety after flu and health-related anxiety triggering some baseline anxiety but she took Klonopin at night for a few days with good effect and now off the Klonopin and anxiety remains in good control and overall moods stable and she is not depressed.  5/1/2024: The patient continues to remain stable with good control of depression and anxiety symptoms.   8/5/2024: Patient remained stable with good control of symptoms of depression and anxiety on current medication regimen.  10/09/2024: Patient remains stable, not depressed, no mood dysregulation and anxiety symptoms are in control, reported daytime tiredness, reduced with lowering trazodone, no change in sleep, wants to lower dose further and monitor 12/17/2024: Patient reported some psychosocial stressors contributing to increased anxiety leading up to some cycle sensitive phenomenon like hearing people calling her name or seeing objects in the corner of her eye which worried her and a lot more but that since she and her sister are no longer fighting with her she feels her mood is improving and that she would rather not make any medication changes and instead wants to find a therapist to help her cope with these stressors.  1/27/2025: Patient shows overall improvement with reduced panic attacks and better family relationships. Anxiety symptoms persist but are less severe. Sleep issues continue to be a concern, with difficulty falling asleep and maintaining sleep throughout the night.  4/9/2025: The patient states she has been doing well overall with controlled anxiety levels and improved mood, thanks to the therapy and medication regimen. However, she is currently seeking a new therapist. 6/18/2025:

## 2025-06-18 NOTE — PLAN
[Medication education provided] : Medication education provided. [Rationale for medication choices, possible risks/precautions, benefits, alternative treatment choices, and consequences of non-treatment discussed] : Rationale for medication choices, possible risks/precautions, benefits, alternative treatment choices, and consequences of non-treatment discussed with patient/family/caregiver  [FreeTextEntry5] : Psychoeducation provided: The abundance of therapy options and programs, including Dialectical Behavior Therapy (DBT) were discussed. She states she previously did DBT and did not like it.  Supportive therapy: The patient was offered support in finding a new therapist suited to her needs. The patient was given two options for new therapy centers: [facility] and [facility]. Medications: Agreed to increase trazodone dose from 50mg to 75mg (half of a 150mg tablet) for sleep. Patient to try this dosage and report back on effectiveness. Will consider increasing to 100mg if needed. Continue Latuda 40 mg/day, trazodone 150 mg HS, continue Celexa to 40 mg/day, Lamictal 200 mg BID Clonazepam 1 mg once a day as needed for panic attacks. (Patient reported today did not need to take Klonopin since last visit and does not need a prescription today) Continue individual therapy (outside) Emergency procedures were discussed: pt. educated to call 911 or go to nearest ER for worsening of symptoms/suicidal/homicidal ideation.  RTC in 3 months or earlier if not tolerating reduction of trazodone Patient expressed understanding and agreement with above plan.   I stop checked: Reference #: [id number]  Practitioner Count: 1 Pharmacy Count: 1 Current Opioid Prescriptions: 1 Current Benzodiazepine Prescriptions: 0 Current Stimulant Prescriptions: 0   Patient Demographic Information (PDI)     PDI	First Name	Last Name	Birth Date	Gender	Street Address	[facility] ALEX Braga	07/23/1982	Female	[address] [facility]	[id number]  Prescription Information    PDI Filter:   PDI	My Rx	Current Rx	Drug Type	Rx Written	Rx Dispensed	Drug	Quantity	Days Supply	Prescriber Name	Prescriber LAKEISHA #	Payment Method	Dispenser A	N	Y	O	06/13/2025	06/13/2025	oxycodone-acetaminophen 5-325 mg tablet	15	5	Prem Wright)	[id number]	Insurance	[facility] #[id number]

## 2025-06-18 NOTE — DISCUSSION/SUMMARY
[FreeTextEntry1] : The patient is a 41 yo woman with a long history of recurrent depression, ADHD with onset in childhood. Caregiver stress+.  She reported mood instability and depression and Lamictal was increased on 11/28/18, with brief mood stability, on 2/15/1:presented with worsening sx of depression, lack of motivation and desire to do things, and decline in functioning and cross titration with tapering and stopping Celexa with titrating Zoloft.  4/3/19: she is so far doing well with sertraline switch, but not complete, continue cross taper and  5/7/19: patient called on 4/23/19 to report that she is not doing well and since she lowered Celexa to 20 mg and increased sertraline to 100 mg she is very irritable, and wanted to go back to lower dose of sertraline and increase Celexa. She reports she is doing worse and thinks this is because she has come down of Celexa quickly, though cross taper was over a period of few weeks and she does not appear to be having withdrawal sx, and insists she was doing the best on combination of Celexa 40 mg and sertraline 25 mg/day, and would like to resume that dose combination. She was informed that they both are SSRI and that it is not recommended patient take 2 SSRIs and if her depression worsens, we would have to consider increasing sertraline, and if so, would recommend tapering off Celexa, or stop Sertraline and go back to previous dose of Celexa, though that was no longer effective and hence the cross taper was started. She states she agrees to increase sertraline and lower Celexa and stop it if her depression were to increase, and wants to remains on reduced dose of Celexa 40 mg/day and sertraline 25 mg/day.  Meds: Celexa 40 mg/day and Sertraline 25, Continue Lamictal 200 mg BID. Continue Trazodone 100 mg HS for insomnia, Increase Vyvanse dose to 30 mg/day, DC Klonopin, switch to Ativan 1 mg BID prn for severe anxiety   5/17/19: Patient states she remains depressed as last visit and not irritable, but very "flat" and is taking low dose Celexa and sertraline.  Patient states she is "not sure anymore" and is worried about going up on Celexa and stay on sertraline, "what if that Celexa does not help".  Continue Celexa 20 mg/day and titrate sertraline up 25 mg every 3 days to 100 mg/day Continue Lamictal 200 mg BID. Continue Trazodone 100 mg HS for insomnia, Increase Vyvanse dose to 30 mg/day,  Ativan 1 mg BID prn for severe anxiety (she will call when she needs refill for ativan)  6/3/19 Patient states she is feeling better with current dose of sertraline, but she is worried about gaining weight and wants to be off it, and agrees to stop Celexa if we switched to another medication to help with depression. She also wants to go back to trial of low Dexedrine Psychoeducation and rationale and side effects, risks and benefits of med changes discussed 6/3/19: Stop Celexa  6/4/19 to 6/7/19: Sertraline 75 mg/day, 6/8 to 6/11 sertraline 50 mg/day, 6/12 to 6/15: Sertraline 25 mg/day, then stop, on 6/4/19 start Viibryd 5 mg/day for a week, then increase to 10 mg/day.  STOP Vyvanse, start Dexedrine 2.5 mg BID  Continue Lamictal 200 mg BID. Continue Trazodone 100 mg HS for insomnia,  Ativan 1 mg BID prn for severe anxiety   6/26/19 Patient is off sertraline and feels Viibryd is better, and agrees to taper and stop Celexa and continue Viibryd Continue Lamictal 200 mg BID. Continue Trazodone 100 mg HS for insomnia,  Ativan 1 mg BID prn for severe anxiety  Dexedrine 5 mg BID Discussed with patient potential for tolerance and dependence with BZD and advise patient not change doses by herself.  BP: 126/92, NM=91: monitor BP at home and lose weight  7/25/19: Patient appears to be tolerating reduction of Celexa dose, no irritability, still depressed, and Dexedrine less effective for ADHD  Increase Viibryd to 20 mg/day, taper Celexa 10 mg every other day for a week, then stop Dexedrine 10 mg AM and 5 mg afternoon.  Continue Lamictal 200 mg BID. Continue Trazodone 100 mg HS for insomnia,  Ativan 1 mg BID prn for severe anxiety   8/6/19: Patient has nausea and vomiting when Viibryd dose was increased. She reports feeling depressed, and irritable, increased psychosocial stress- mother with terminal illness Taper and stop Viibryd. Titrate Celexa back to 40 mg/day, will add Latuda to augment Antidepressant.  Continue Dexedrine 10 mg AM and 5 mg afternoon, Lamictal 200 mg BID, continue Trazodone 100 mg HS for insomnia,  Ativan 1 mg BID prn for severe anxiety   09/12/19: Mother passed away on 08/08, patient is grieving mother's loss, and overall stable with current meds.  Celexa 20 mg/day, if she has worsening of depression, increase Celexa to 30 mg/day, agree with not starting Latuda  10/22/19: Patient states she is missing mother, has good and bad days, and reports increased worrying about germs.   12/3/19: Patient reports she feels depressed and anxious and feeling lost and hopeless and helpless.   12/27/19: Patient reports she feels a lot better with reduced depression and anxiety sx with addition of Latuda.   2/6/2020: patient reports she continues to feel better with significant reduction of depression and anxiety sx.   4/21/2020: Patient reports she is doing well since last visit, denies sx of depression, and anxiety is in good control, and denies intrusive thoughts and denies irritability and anger episodes.   7/14/2020: Patient remains stable with good control symptoms on current meds.   09/21/2020: Patient continues to remain stable with good control symptoms on current meds.   12/18/2020: Patient remains stable, no sx of depression and anxiety and or irritability, stable on current meds   12/23/2020: Patient reports no change from last week, states she forgot to ask about Rx for Klonopin. She states she does not know when she got Rx for it, but she had used ti as needed in the past year with better effect., She states she was taking Ativan as needed when she had one episode of hallucination when she travelled to [place] and is afraid to take Ativan anymore.   3/19/2021: Patient remains stable, no sx of depression and or anxiety reports from last visit. She states she is working more consistently and having trouble focusing and wants to resume Dexedrine again.   7/26/2021:  Patient continues to remains stable. Patient reports She feels current medications are helping.  She states she did not need to take Klonopin since last visit.    08/16/2021: Patient comes today to discuss her meds and recent abnormal hormone levels, she had been anxious about what the abnormal levels mean and how it would affect her to be coming off her psych meds because she feels stable on current med combination. Serum prolactin level is minimally elevated, she denies gynecomastia, galactorrhea, and considering patient is stable and on low dose of Latuda, will wait until patient sees endocrinologist for further assessment before making change to psychotropic meds. patient states she does not want to switch to a different provider at this time as planned previously due to change in insurance coverage  11/08/2021: Patient states knowing prolactin level and MRI barin are normal she feels anxiety sx improved from last visit and states sx of depression are in good control and no mood dysregulation reported since last visit.   2/7/2022: Patient reports that her depression and anxiety symptoms remain in good control with the current medication she however is frustrated and upset that she is not losing weight and though topamax is helping with migraine HA, she does not like it is giving her constipation and is upset that it's not aiding her with weight loss. Stetse Dexedrine helps with ADHD sx and also reducing appetite and managing her weight better  3/4/2022: Patient states she is feeling very anxious, tensed and on edge. She is on max dose of Celexa and did not tolerate well before when she was tapered off it, and she perkins snot want top take Klonopin frequently or daily, discussed increasing Latuda to augment citalopram to help reduce anxiety and worrying and she agrees to a trial, monitor for sx improvement and side effects.   4/08/2022:  Patient did not start using higher dose of Latuda, reports that her mood and anxiety symptoms improved as she came off of Topamax and got her period. She reports that the trazodone is not helping her fall asleep anymore and she's waking up feeling very tired and groggy with a higher dose of trazodone. Sleep hygiene education provided to the patient and advise patient lower trazodone for four days to 50 mg at bedtime and then stop it and start taking doxepin 3 mg HS for insomnia   5/2/2022: Patient stopped taking doxepin because she felt it was not helping and actually made her more anxious.  She is taking trazodone but still continues to have initial insomnia.  We discussed sleep hygiene and that we will try increasing trazodone to 150 mg daily at bedtime.  8/5/2022: Patient reports that increasing trazodone does help with the sleep and she reports she is not feeling depressed and anxiety is in good control and her mood is stable.  11/14/2022: Patient reported that for the past 2 weeks she is experiencing increased anxiety and irritability and poor sleep.  Discussed with the patient increasing Latuda to address mood irritability and she agreed to increase the dose and monitor   12/14/2022: Patient reported that she is feeling better with the reduction in irritability, racing thoughts and anxiety but still not 100% and states that overall she is feeling about 60% improvement and she wants to continue the same dose and continue to monitor for incremental improvement of anxiety symptoms.  2/13/2023: Patient continues to remain stable with good control of symptoms of depression slight increase in anxiety related to psychosocial stressors and in therapy she states learning to examine and change her behavior to manage relationships better.  5/8/2023: Patient reported that she is doing well and reported good control of symptoms of depression and anxiety with current medication regimen, denied panic attacks, denied symptoms suggestive of psychosis and feels that at this time she does not need ADD medications and she is also glad that she has been able to lose weight with the new medication regimen suggested by her primary care physician for weight loss  8/7/2023: Patient continues to remain stable doing well on current medications with good control of symptoms of depression  11/06/2023: Patient remains stable, no symptoms of depression anxiety in good control and tolerating current medications well.  2/2/2024: Patient reported a brief.  Of increase in anxiety after flu and health-related anxiety triggering some baseline anxiety but she took Klonopin at night for a few days with good effect and now off the Klonopin and anxiety remains in good control and overall moods stable and she is not depressed.  5/1/2024: The patient continues to remain stable with good control of depression and anxiety symptoms.   8/5/2024: Patient remained stable with good control of symptoms of depression and anxiety on current medication regimen.  10/09/2024: Patient remains stable, not depressed, no mood dysregulation and anxiety symptoms are in control, reported daytime tiredness, reduced with lowering trazodone, no change in sleep, wants to lower dose further and monitor 12/17/2024: Patient reported some psychosocial stressors contributing to increased anxiety leading up to some cycle sensitive phenomenon like hearing people calling her name or seeing objects in the corner of her eye which worried her and a lot more but that since she and her sister are no longer fighting with her she feels her mood is improving and that she would rather not make any medication changes and instead wants to find a therapist to help her cope with these stressors.  1/27/2025: Patient shows overall improvement with reduced panic attacks and better family relationships. Anxiety symptoms persist but are less severe. Sleep issues continue to be a concern, with difficulty falling asleep and maintaining sleep throughout the night.  4/9/2025: The patient states she has been doing well overall with controlled anxiety levels and improved mood, thanks to the therapy and medication regimen. However, she is currently seeking a new therapist. 6/18/2025:

## 2025-06-18 NOTE — PLAN
[Medication education provided] : Medication education provided. [Rationale for medication choices, possible risks/precautions, benefits, alternative treatment choices, and consequences of non-treatment discussed] : Rationale for medication choices, possible risks/precautions, benefits, alternative treatment choices, and consequences of non-treatment discussed with patient/family/caregiver  [FreeTextEntry5] : Psychoeducation provided: The abundance of therapy options and programs, including Dialectical Behavior Therapy (DBT) were discussed. She states she previously did DBT and did not like it.  Supportive therapy: The patient was offered support in finding a new therapist suited to her needs. The patient was given two options for new therapy centers: [facility] and [facility]. Medications: Agreed to increase trazodone dose from 50mg to 75mg (half of a 150mg tablet) for sleep. Patient to try this dosage and report back on effectiveness. Will consider increasing to 100mg if needed. Continue Latuda 40 mg/day, trazodone 150 mg HS, continue Celexa to 40 mg/day, Lamictal 200 mg BID Clonazepam 1 mg once a day as needed for panic attacks. (Patient reported today did not need to take Klonopin since last visit and does not need a prescription today) Continue individual therapy (outside) Emergency procedures were discussed: pt. educated to call 911 or go to nearest ER for worsening of symptoms/suicidal/homicidal ideation.  RTC in 3 months or earlier if not tolerating reduction of trazodone Patient expressed understanding and agreement with above plan.   I stop checked: Reference #: [id number]  Practitioner Count: 1 Pharmacy Count: 1 Current Opioid Prescriptions: 1 Current Benzodiazepine Prescriptions: 0 Current Stimulant Prescriptions: 0   Patient Demographic Information (PDI)     PDI	First Name	Last Name	Birth Date	Gender	Street Address	[facility] Code ALEX Braga	07/23/1982	Female	[address] [facility]	[id number]  Prescription Information    PDI Filter:   PDI	My Rx	Current Rx	Drug Type	Rx Written	Rx Dispensed	Drug	Quantity	Days Supply	Prescriber Name	Prescriber LAKEISHA #	Payment Method	Dispenser A	N	Y	O	06/13/2025	06/13/2025	oxycodone-acetaminophen 5-325 mg tablet	15	5	Prem Wright)	[id number]	Insurance	[facility] #[id number]

## 2025-06-18 NOTE — REASON FOR VISIT
[Patient preference] : as per patient preference [Telehealth (audio & video) - Individual/Group] : This visit was provided via telehealth using real-time 2-way audio visual technology. [Medical Office: ([facility])___] : The provider was located at the medical office in [unfilled]. [Home] : The patient, [unfilled], was located at home, [unfilled], at the time of the visit. [Patient's space is appropriate for telehealth and maintains privacy/confidentiality.] : Patient's space is appropriate for telehealth and maintains privacy/confidentiality. [Participant(s) identity verified] : Participant(s) identity verified. [Verbal consent obtained from patient/other participant(s)] : Verbal consent for telehealth/telephonic services obtained from patient/other participant(s) [Patient] : Patient [FreeTextEntry1] : depression, anxiety

## 2025-07-23 NOTE — DISCUSSION/SUMMARY
[FreeTextEntry1] : Patient requested Rx for trazodone 150 mg 1 tab HS instead of half tab HS. She left message that she has been taking 150 mg HS with good effect. Trazodone dosage within in range for tx of insomnia, will send a Rx to allow flexibility to take effective dose. Next appointment scheduled on 09/3/2025. Any further dose increase/cahnge will need a follow up visit to review the need.